# Patient Record
Sex: FEMALE | Race: WHITE | Employment: FULL TIME | URBAN - METROPOLITAN AREA
[De-identification: names, ages, dates, MRNs, and addresses within clinical notes are randomized per-mention and may not be internally consistent; named-entity substitution may affect disease eponyms.]

---

## 2017-04-01 ENCOUNTER — TRANSFERRED RECORDS (OUTPATIENT)
Dept: HEALTH INFORMATION MANAGEMENT | Facility: CLINIC | Age: 37
End: 2017-04-01

## 2017-04-01 LAB — PAP SMEAR - HIM PATIENT REPORTED: NEGATIVE

## 2018-01-03 ENCOUNTER — RECORDS - HEALTHEAST (OUTPATIENT)
Dept: LAB | Facility: CLINIC | Age: 38
End: 2018-01-03

## 2018-01-03 LAB — HIV 1+2 AB+HIV1 P24 AG SERPL QL IA: NEGATIVE

## 2018-01-04 LAB
C TRACH DNA SPEC QL PROBE+SIG AMP: NEGATIVE
HCV AB SERPL QL IA: NEGATIVE
N GONORRHOEA DNA SPEC QL NAA+PROBE: NEGATIVE
SYPHILIS RPR SCREEN - HISTORICAL: NORMAL

## 2018-01-05 LAB
HSV1 IGG SERPL QL IA: NEGATIVE
HSV2 IGG SERPL QL IA: NEGATIVE

## 2018-01-08 LAB
HUMAN PAPILLOMA VIRUS 16 DNA: NEGATIVE
HUMAN PAPILLOMA VIRUS 18 DNA: NEGATIVE
HUMAN PAPILLOMA VIRUS FINAL DIAGNOSIS: NORMAL
HUMAN PAPILLOMA VIRUS OTHER HR: NEGATIVE
SPECIMEN DESCRIPTION: NORMAL

## 2018-09-18 ENCOUNTER — RECORDS - HEALTHEAST (OUTPATIENT)
Dept: LAB | Facility: CLINIC | Age: 38
End: 2018-09-18

## 2018-09-18 LAB — HIV 1+2 AB+HIV1 P24 AG SERPL QL IA: NEGATIVE

## 2018-09-19 LAB
C TRACH DNA SPEC QL PROBE+SIG AMP: NEGATIVE
HBV SURFACE AG SERPL QL IA: NEGATIVE
HCV AB SERPL QL IA: NEGATIVE
N GONORRHOEA DNA SPEC QL NAA+PROBE: NEGATIVE
T PALLIDUM AB SER QL: NEGATIVE

## 2019-04-22 ENCOUNTER — ANCILLARY PROCEDURE (OUTPATIENT)
Dept: GENERAL RADIOLOGY | Facility: CLINIC | Age: 39
End: 2019-04-22
Attending: PODIATRIST
Payer: COMMERCIAL

## 2019-04-22 ENCOUNTER — OFFICE VISIT (OUTPATIENT)
Dept: PODIATRY | Facility: CLINIC | Age: 39
End: 2019-04-22
Payer: COMMERCIAL

## 2019-04-22 VITALS
DIASTOLIC BLOOD PRESSURE: 80 MMHG | SYSTOLIC BLOOD PRESSURE: 130 MMHG | BODY MASS INDEX: 18.83 KG/M2 | HEIGHT: 67 IN | WEIGHT: 120 LBS | HEART RATE: 81 BPM

## 2019-04-22 DIAGNOSIS — M79.675 PAIN OF TOE OF LEFT FOOT: ICD-10-CM

## 2019-04-22 DIAGNOSIS — L60.3 ONYCHODYSTROPHY: ICD-10-CM

## 2019-04-22 DIAGNOSIS — M79.675 PAIN OF TOE OF LEFT FOOT: Primary | ICD-10-CM

## 2019-04-22 PROCEDURE — 73660 X-RAY EXAM OF TOE(S): CPT | Mod: LT

## 2019-04-22 PROCEDURE — 99203 OFFICE O/P NEW LOW 30 MIN: CPT | Performed by: PODIATRIST

## 2019-04-22 ASSESSMENT — MIFFLIN-ST. JEOR: SCORE: 1261.95

## 2019-04-22 NOTE — LETTER
"    4/22/2019         RE: Emily Chandler  [address]  [address]  [facility] [address]        Dear Colleague,    Thank you for referring your patient, Emily Chandler, to the [facility]. Please see a copy of my visit note below.    PATIENT HISTORY:  Emily Chandler is a 39 year old female who presents to clinic for L 1st toe pain.  Pt \"ran into a revolving door\" 5 year sago and the toe was bruised and painful.  She didn't get an XR.  Still has some pain at the level of the IPJ with activity.  Nail was damaged at that time as well.  She thinks she may have injured it again 2 months ago, as the nail is now cracking and notes a new bruise.  0-5/10 pain.  She tried tea tree oil, Vicks for a month.  Not improving.      Review of Systems:  Patient denies fever, chills, wound, stiffness, limping, numbness, weakness, heart burn, blood in stool, chest pain with activity, calf pain when walking, shortness of breath with activity, chronic cough, easy bleeding/bruising, swelling of ankles, excessive thirst, fatigue, depression, anxiety.  Patient admits to rash, neck stiffness.     PAST MEDICAL HISTORY:  No pertinent past medical history.     PAST SURGICAL HISTORY: No pertinent past surgical history.     MEDICATIONS: No current outpatient medications on file.     ALLERGIES:  No Known Allergies     SOCIAL HISTORY:   Social History     Socioeconomic History     Marital status:      Spouse name: Not on file     Number of children: Not on file     Years of education: Not on file     Highest education level: Not on file   Occupational History     Not on file   Social Needs     Financial resource strain: Not on file     Food insecurity:     Worry: Not on file     Inability: Not on file     Transportation needs:     Medical: Not on file     Non-medical: Not on file   Tobacco Use     Smoking status: Never Smoker     Smokeless tobacco: Never Used   Substance and Sexual Activity     Alcohol use: Not on file     " "Drug use: Not on file     Sexual activity: Not on file   Lifestyle     Physical activity:     Days per week: Not on file     Minutes per session: Not on file     Stress: Not on file   Relationships     Social connections:     Talks on phone: Not on file     Gets together: Not on file     Attends Yarsani service: Not on file     Active member of club or organization: Not on file     Attends meetings of clubs or organizations: Not on file     Relationship status: Not on file     Intimate partner violence:     Fear of current or ex partner: Not on file     Emotionally abused: Not on file     Physically abused: Not on file     Forced sexual activity: Not on file   Other Topics Concern     Not on file   Social History Narrative     Not on file        FAMILY HISTORY: No pertinent family history.     EXAM:Vitals: /80   Pulse 81   Ht 1.702 m (5' 7\")   Wt 54.4 kg (120 lb)   BMI 18.79 kg/m     BMI= Body mass index is 18.79 kg/m .    General appearance: Patient is alert and fully cooperative with history & exam.  No sign of distress is noted during the visit.     Psychiatric: Affect is pleasant & appropriate.  Patient appears motivated to improve health.     Respiratory: Breathing is regular & unlabored while sitting.     HEENT: Hearing is intact to spoken word.  Speech is clear.  No gross evidence of visual impairment that would impact ambulation.     Dermatologic: L hallux nail has a longitudinal crack distally.  Proximal medial corner with small area that looks like a subungual bruise.  No pain.  No paronychia or evidence of soft tissue infection is noted.     Vascular: DP & PT pulses are intact & regular bilaterally.  No significant edema or varicosities noted.  CFT and skin temperature are normal to both lower extremities.     Neurologic: Lower extremity sensation is intact to light touch.  No evidence of weakness or contracture in the lower extremities.  No evidence of neuropathy.     Musculoskeletal: Pt's L " 1st toe is quite sensitive at the IPJ with palpation or joint motion.  Patient is ambulatory without assistive device or brace.  No gross ankle deformity noted.  No foot or ankle joint effusion is noted.    XRs of L 1st toe reviewed with pt.  Perhaps some chronic cortical thickening of distal phalanx indicating old fx/injury.     ASSESSMENT: L 1st toe pain, onychodystrophy     PLAN:  Reviewed patient's chart in epic.  Discussed condition and treatment options including pros and cons.    Question prior fx that may be leading to some continued toe pain.  I don't see evidence of nonunion.  Discussed if fx entered the joint perhaps some early DJD is a component.  Discussed stiff soled shoes, orthotics as helpful.  Discussed option of IPJ steroid injection by IR.  Surgical fusion can be considered if injection helps, and pain returns.  Pt deferred further intervention.    Discussed nail changes.  Likely permanent due to trauma.  Fungus possible.    Discussed causes of nail fungus.  Discussed treatment options with patient and explained that there isn't one treatment that is 100% effective.  Debridement is an option.  Discussed oral lamisil which is the most effective at about 70% but can have liver effects.  Discussed over the counter antifungal creams.  Explained that these are less effective and need to be applied twice a day for about 3-4 months.  Also talked about prescription topicals, which have similar efficacy.  Also discussed that if there was damage to the nail and the nail is now dystrophic that none of the above is going to change the nail.  Discussed that if it becomes painful, we can remove the nail in clinic.      Pt will monitor.  Advised she watch the dark area to make sure it is moving out with nail growth over the next 3 months.  If not, she should return for recheck to consider nail removal to assess underlying skin.    F/u prn.    Tavares Sen DPM, FACFAS          Again, thank you for  allowing me to participate in the care of your patient.        Sincerely,        Tavares Sen DPM

## 2019-04-22 NOTE — PATIENT INSTRUCTIONS
Thank you for choosing [facility]!    Follow up as needed    DR. ROUSE'S CLINIC LOCATIONS     MONDAY  [place] TUESDAY & FRIDAY AM  [place]   [address]   [address]   [place] [address] [place] [address]   [phone]  [phone]  [phone]       WEDNESDAY  [facility] [place] SCHEDULE SURGERY: [phone]   [address] [facility] APPOINTMENTS: [phone]   [place] [address] BILLING QUESTIONS: [phone]   [phone]       NAIL FUNGUS / ONYCHOMYCOSIS   Nail fungus is not a hygiene problem and will not likely lead to significant medical   problems. The nails may get thick causing pain and possibly local skin infection.   Treatments include debridement (trimming), oral antifungals, topical antifungals and complete removal of the nail. Most fungal nails are not treated.   Topicals such as tea tree oil can be helpful for surface fungus and may, at best, limit   progression. Over the counter creams (such as Lamisil) can also be used however, their effectiveness is also quite low.  Topical treatment with Pen lac is expensive and often not covered by insurance. Pen lac has an approximate 8% success rate. Topical therapy recommendations is to apply twice a day for at least 3-4 months as it takes 9 months for new nail to grow out.    Experts suggest soaking your feet for 15 to 20 minutes in a mixture of 1 cup vinegar to 4 cups warm water. Be sure to rinse well and pat your feet dry when you're done. You can soak your feet like this daily. But if your skin becomes irritated, try soaking only two to three times a week. Vicks VapoRub, as with vinegar, there have been no controlled clinical trials to assess the effectiveness of Vicks VapoRub on nail fungus, but there have been numerous anecdotal reports that it works. There's no consensus on how often to apply this product, so check with your doctor before using it on  your nails.     Oral therapies include Sporanox and Lamisil. Oral therapies are also expensive and not very effective. Side effects such as liver disease are the main concern. Return of fungus is common even if the treatment worked.     Other Tips:  - Penlac nail medication apply daily x 4 months; remove old polish first day of each week  - Antifungal cream/powder (Zeasorb) - apply daily to feet and shoes x 2 months  - Clean shoes with Lysol or in washing machine every few weeks  - Rotate shoe gear; give them 24 hours to dry out between days wearing them  - Clean pair of socks in morning, clean pair in afternoon if your feet sweat  - Shower shoes used in public showers/pools    OVER THE COUNTER INSERTS    SuperFeet   SofsoDefense Mobile Fit MobileVeda   Power Step   Walk-Fit Arch Cradles     Most of these can be found at your local [employer], [employer], sporting goods [employer], or online:    1.  https://[email or web address].CaterCow/en-us/  2.  Https://Osteomimetics/  3.  Https://www.powersteps.com/    **A good high quality over the counter insert should cost around $40-$50          BODY WEIGHT AND YOUR FEET  The following information is included in the after visit summary for all patients. Body weight can be a sensitive issue to discuss in clinic, but we think the following information is very important. Although we focus on the feet and ankles, we do support the overall health of our patients.     Many things can cause foot and ankle problems. Foot structure, activity level, foot mechanics and injuries are common causes of pain. One very important issue that often goes unmentioned, is body weight. Extra weight can cause increased stress on muscles, ligaments, bones and tendons. Sometimes just a few extra pounds is all it takes to put one over her/his threshold. Without reducing that stress, it can be difficult to alleviate pain. As Foot & Ankle specialists, our job is addressing the lower extremity problem and possible  causes. Regarding extra body weight, we encourage patients to discuss diet and weight management plans with their primary care doctors. It is this team approach that gives you the best opportunity for pain relief and getting you back on your feet.      [facility] has a Comprehensive Weight Management Program. This program includes counseling, education, non-surgical and surgical approaches to weight loss. If you are interested in learning more either talk to you primary care provider or call [phone].

## 2019-04-22 NOTE — PROGRESS NOTES
"PATIENT HISTORY:  Emily Chandler is a 39 year old female who presents to clinic for L 1st toe pain.  Pt \"ran into a revolving door\" 5 year sago and the toe was bruised and painful.  She didn't get an XR.  Still has some pain at the level of the IPJ with activity.  Nail was damaged at that time as well.  She thinks she may have injured it again 2 months ago, as the nail is now cracking and notes a new bruise.  0-5/10 pain.  She tried tea tree oil, Vicks for a month.  Not improving.      Review of Systems:  Patient denies fever, chills, wound, stiffness, limping, numbness, weakness, heart burn, blood in stool, chest pain with activity, calf pain when walking, shortness of breath with activity, chronic cough, easy bleeding/bruising, swelling of ankles, excessive thirst, fatigue, depression, anxiety.  Patient admits to rash, neck stiffness.     PAST MEDICAL HISTORY:  No pertinent past medical history.     PAST SURGICAL HISTORY: No pertinent past surgical history.     MEDICATIONS: No current outpatient medications on file.     ALLERGIES:  No Known Allergies     SOCIAL HISTORY:   Social History     Socioeconomic History     Marital status:      Spouse name: Not on file     Number of children: Not on file     Years of education: Not on file     Highest education level: Not on file   Occupational History     Not on file   Social Needs     Financial resource strain: Not on file     Food insecurity:     Worry: Not on file     Inability: Not on file     Transportation needs:     Medical: Not on file     Non-medical: Not on file   Tobacco Use     Smoking status: Never Smoker     Smokeless tobacco: Never Used   Substance and Sexual Activity     Alcohol use: Not on file     Drug use: Not on file     Sexual activity: Not on file   Lifestyle     Physical activity:     Days per week: Not on file     Minutes per session: Not on file     Stress: Not on file   Relationships     Social connections:     Talks on phone: Not on " "file     Gets together: Not on file     Attends [employer] service: Not on file     Active member of club or organization: Not on file     Attends meetings of clubs or organizations: Not on file     Relationship status: Not on file     Intimate partner violence:     Fear of current or ex partner: Not on file     Emotionally abused: Not on file     Physically abused: Not on file     Forced sexual activity: Not on file   Other Topics Concern     Not on file   Social History Narrative     Not on file        FAMILY HISTORY: No pertinent family history.     EXAM:Vitals: /80   Pulse 81   Ht 1.702 m (5' 7\")   Wt 54.4 kg (120 lb)   BMI 18.79 kg/m    BMI= Body mass index is 18.79 kg/m .    General appearance: Patient is alert and fully cooperative with history & exam.  No sign of distress is noted during the visit.     Psychiatric: Affect is pleasant & appropriate.  Patient appears motivated to improve health.     Respiratory: Breathing is regular & unlabored while sitting.     HEENT: Hearing is intact to spoken word.  Speech is clear.  No gross evidence of visual impairment that would impact ambulation.     Dermatologic: L hallux nail has a longitudinal crack distally.  Proximal medial corner with small area that looks like a subungual bruise.  No pain.  No paronychia or evidence of soft tissue infection is noted.     Vascular: DP & PT pulses are intact & regular bilaterally.  No significant edema or varicosities noted.  CFT and skin temperature are normal to both lower extremities.     Neurologic: Lower extremity sensation is intact to light touch.  No evidence of weakness or contracture in the lower extremities.  No evidence of neuropathy.     Musculoskeletal: Pt's L 1st toe is quite sensitive at the IPJ with palpation or joint motion.  Patient is ambulatory without assistive device or brace.  No gross ankle deformity noted.  No foot or ankle joint effusion is noted.    XRs of L 1st toe reviewed with pt.  Perhaps " some chronic cortical thickening of distal phalanx indicating old fx/injury.  Small ossicle to plantar IPJ area, question sesamoid vs old fx fragment.     ASSESSMENT: L 1st toe pain, onychodystrophy     PLAN:  Reviewed patient's chart in epic.  Discussed condition and treatment options including pros and cons.    Question prior fx that may be leading to some continued toe pain.  I don't see evidence of nonunion.  Discussed if fx entered the joint perhaps some early DJD is a component.  Discussed stiff soled shoes, orthotics as helpful.  Discussed option of IPJ steroid injection by IR.  Surgical fusion can be considered if injection helps, and pain returns.  Pt deferred further intervention.    Discussed nail changes.  Likely permanent due to trauma.  Fungus possible.    Discussed causes of nail fungus.  Discussed treatment options with patient and explained that there isn't one treatment that is 100% effective.  Debridement is an option.  Discussed oral lamisil which is the most effective at about 70% but can have liver effects.  Discussed over the counter antifungal creams.  Explained that these are less effective and need to be applied twice a day for about 3-4 months.  Also talked about prescription topicals, which have similar efficacy.  Also discussed that if there was damage to the nail and the nail is now dystrophic that none of the above is going to change the nail.  Discussed that if it becomes painful, we can remove the nail in clinic.      Pt will monitor.  Advised she watch the dark area to make sure it is moving out with nail growth over the next 3 months.  If not, she should return for recheck to consider nail removal to assess underlying skin.    F/u prn.    Tavares Sen DPM, FACFAS

## 2019-05-07 ENCOUNTER — OFFICE VISIT (OUTPATIENT)
Dept: OTOLARYNGOLOGY | Facility: CLINIC | Age: 39
End: 2019-05-07
Payer: COMMERCIAL

## 2019-05-07 VITALS
SYSTOLIC BLOOD PRESSURE: 125 MMHG | OXYGEN SATURATION: 100 % | DIASTOLIC BLOOD PRESSURE: 77 MMHG | HEART RATE: 72 BPM | HEIGHT: 67 IN | BODY MASS INDEX: 18.83 KG/M2 | WEIGHT: 120 LBS

## 2019-05-07 DIAGNOSIS — J34.2 NASAL SEPTAL DEVIATION: ICD-10-CM

## 2019-05-07 DIAGNOSIS — J34.3 NASAL TURBINATE HYPERTROPHY: ICD-10-CM

## 2019-05-07 DIAGNOSIS — J34.89 NASAL OBSTRUCTION: Primary | ICD-10-CM

## 2019-05-07 PROCEDURE — 99204 OFFICE O/P NEW MOD 45 MIN: CPT | Performed by: OTOLARYNGOLOGY

## 2019-05-07 ASSESSMENT — MIFFLIN-ST. JEOR: SCORE: 1261.95

## 2019-05-07 NOTE — PROGRESS NOTES
Chief Complaint - Nasal obstruction    History of Present Illness - Emily Chandler is a 39 year old female who presents for evaluation of nasal obstruction. The patient describes symptoms of left-sided nasal obstruction for the past many years. No epistaxis. The patient notes symptoms most predominately on the left side. The patient notes some mild seasonal allergies. 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 Now she uses saline. possible history of nasal trauma. No prior history of nasal surgery. No history of smoking.     Past Medical History - healthy    Allergies - No Known Allergies    Social History -   Social History     Socioeconomic History     Marital status:      Spouse name: Not on file     Number of children: Not on file     Years of education: Not on file     Highest education level: Not on file   Occupational History     Not on file   Social Needs     Financial resource strain: Not on file     Food insecurity:     Worry: Not on file     Inability: Not on file     Transportation needs:     Medical: Not on file     Non-medical: Not on file   Tobacco Use     Smoking status: Never Smoker     Smokeless tobacco: Never Used   Substance and Sexual Activity     Alcohol use: Not on file     Drug use: Not on file     Sexual activity: Not on file   Lifestyle     Physical activity:     Days per week: Not on file     Minutes per session: Not on file     Stress: Not on file   Relationships     Social connections:     Talks on phone: Not on file     Gets together: Not on file     Attends Rastafari service: Not on file     Active member of club or organization: Not on file     Attends meetings of clubs or organizations: Not on file     Relationship status: Not on file     Intimate partner violence:     Fear of current or ex partner: Not on file     Emotionally abused: Not on file     Physically abused: Not on file     Forced sexual activity: Not on file   Other  "Topics Concern     Not on file   Social History Narrative     Not on file   nonsmoker    Family History - no known history of sinus or nose problems.    Review of Systems - As per HPI and PMHx, otherwise 10+ comprehensive system review is negative.    Physical Exam  /77   Pulse 72   Ht 1.702 m (5' 7\")   Wt 54.4 kg (120 lb)   SpO2 100%   BMI 18.79 kg/m    General - The patient is in no distress. Alert and oriented to person and place, answers questions and cooperates with examination appropriately.   Neurologic - CN II-XII are grossly intact. No focal neurologic deficits.   Voice and Breathing - The patient was breathing comfortably without the use of accessory muscles. There was no wheezing, stridor, or stertor.  The patients voice was clear and strong.  Eyes - Extraocular movements intact. Sclera were not icteric or injected, conjunctiva were pink and moist.  Mouth - Examination of the oral cavity showed pink, healthy oral mucosa. No lesions or ulcerations noted.  The tongue was mobile and midline, and the dentition were in good condition.    Throat - The walls of the oropharynx were smooth, pink, moist, symmetric, and had no lesions or ulcerations.  The tonsillar pillars and soft palate were symmetric.  The uvula was midline on elevation. Tonsils absent.  Neck -  Palpation of the occipital, submental, submandibular, internal jugular chain, and supraclavicular nodes did not demonstrate any abnormal lymph nodes or masses. No parotid masses. Palpation of the thyroid was soft and smooth, with no nodules or goiter appreciated.  The trachea was mobile and midline.  Cardiovascular - carotid pulses are 2+ bilaterally, regular rhythm  Nose - External contour is symmetric, no gross deflection or scars.  Nasal mucosa is pink and moist with clear mucus. Her turbinates are hypertrophic. The septum was very deviated to the left.  No polyps, masses, or purulence noted on examination.        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  Her septum is very severely deviated to the left anteriorly and completely blocks the airway.  Therefore I recommend septoplasty and bilateral inferior turbinate reduction.    Based on the physical exam and history, my recommendation is for endoscopic septoplasty with or without turbinate reduction. I counseled the patient on the risks of surgery, including infection, bleeding, risks of general anesthesia, the risk of failure of the surgery to relieve nasal obstruction, the need for revision. They understood and wished to proceed to scheduling.      Alfonso Iniguez MD  Otolaryngology  [facility]

## 2019-05-07 NOTE — LETTER
5/7/2019         RE: Emily Chandler  [address]  [address]  [facility] [address]        Dear Colleague,    Thank you for referring your patient, Emily Chandler, to the [facility]. Please see a copy of my visit note below.    Chief Complaint - Nasal obstruction    History of Present Illness - Emily Chandler is a 39 year old female who presents for evaluation of nasal obstruction. The patient describes symptoms of left-sided nasal obstruction for the past many years. No epistaxis. The patient notes symptoms most predominately on the left side. The patient notes some mild seasonal allergies. 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 Now she uses saline. possible history of nasal trauma. No prior history of nasal surgery. No history of smoking.     Past Medical History - healthy    Allergies - No Known Allergies    Social History -   Social History     Socioeconomic History     Marital status:      Spouse name: Not on file     Number of children: Not on file     Years of education: Not on file     Highest education level: Not on file   Occupational History     Not on file   Social Needs     Financial resource strain: Not on file     Food insecurity:     Worry: Not on file     Inability: Not on file     Transportation needs:     Medical: Not on file     Non-medical: Not on file   Tobacco Use     Smoking status: Never Smoker     Smokeless tobacco: Never Used   Substance and Sexual Activity     Alcohol use: Not on file     Drug use: Not on file     Sexual activity: Not on file   Lifestyle     Physical activity:     Days per week: Not on file     Minutes per session: Not on file     Stress: Not on file   Relationships     Social connections:     Talks on phone: Not on file     Gets together: Not on file     Attends Synagogue service: Not on file     Active member of club or organization: Not on file     Attends meetings of clubs or organizations: Not  "on file     Relationship status: Not on file     Intimate partner violence:     Fear of current or ex partner: Not on file     Emotionally abused: Not on file     Physically abused: Not on file     Forced sexual activity: Not on file   Other Topics Concern     Not on file   Social History Narrative     Not on file   nonsmoker    Family History - no known history of sinus or nose problems.    Review of Systems - As per HPI and PMHx, otherwise 10+ comprehensive system review is negative.    Physical Exam  /77   Pulse 72   Ht 1.702 m (5' 7\")   Wt 54.4 kg (120 lb)   SpO2 100%   BMI 18.79 kg/m     General - The patient is in no distress. Alert and oriented to person and place, answers questions and cooperates with examination appropriately.   Neurologic - CN II-XII are grossly intact. No focal neurologic deficits.   Voice and Breathing - The patient was breathing comfortably without the use of accessory muscles. There was no wheezing, stridor, or stertor.  The patients voice was clear and strong.  Eyes - Extraocular movements intact. Sclera were not icteric or injected, conjunctiva were pink and moist.  Mouth - Examination of the oral cavity showed pink, healthy oral mucosa. No lesions or ulcerations noted.  The tongue was mobile and midline, and the dentition were in good condition.    Throat - The walls of the oropharynx were smooth, pink, moist, symmetric, and had no lesions or ulcerations.  The tonsillar pillars and soft palate were symmetric.  The uvula was midline on elevation. Tonsils absent.  Neck -  Palpation of the occipital, submental, submandibular, internal jugular chain, and supraclavicular nodes did not demonstrate any abnormal lymph nodes or masses. No parotid masses. Palpation of the thyroid was soft and smooth, with no nodules or goiter appreciated.  The trachea was mobile and midline.  Cardiovascular - carotid pulses are 2+ bilaterally, regular rhythm  Nose - External contour is symmetric, " no gross deflection or scars.  Nasal mucosa is pink and moist with clear mucus. Her turbinates are hypertrophic. The septum was very deviated to the left.  No polyps, masses, or purulence noted on examination.        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  Her septum is very severely deviated to the left anteriorly and completely blocks the airway.  Therefore I recommend septoplasty and bilateral inferior turbinate reduction.    Based on the physical exam and history, my recommendation is for endoscopic septoplasty with or without turbinate reduction. I counseled the patient on the risks of surgery, including infection, bleeding, risks of general anesthesia, the risk of failure of the surgery to relieve nasal obstruction, the need for revision. They understood and wished to proceed to scheduling.      Alfonso Iniguez MD  Otolaryngology  [facility]      Again, thank you for allowing me to participate in the care of your patient.        Sincerely,        Alfonso Iniguez MD

## 2019-05-07 NOTE — PATIENT INSTRUCTIONS
General Scheduling Information  To schedule your CT/MRI scan, please contact [place] at [phone]   [address] [place] [address]    To schedule your Surgery, please contact our Specialty Schedulers at [phone]    ENT Clinic Locations Clinic Hours Telephone Number     [facility]  [address] [place] [address]   Tuesday:       8:00am -- 4:00pm    Wednesday:  8:00am - 4:00pm   To schedule an appointment with   Dr. Iniguez,   please contact our   Specialty Scheduling Department at:     [phone]       [facility]  [address] [place] [address]   Friday:          8:00am - 4:00pm         Urgent Care Locations Clinic Hours Telephone Numbers     [facility]  [address] [place] [address]     Monday-Friday:     11:00pm - 9:00pm    Saturday-Sunday:  9:00am - 5:00pm   [phone]     [facility]  [address] [place] [address]     Monday-Friday:      5:00pm - 9:00pm     Saturday-Sunday:  9:00am - 5:00pm   [phone]

## 2019-05-08 ENCOUNTER — TELEPHONE (OUTPATIENT)
Dept: OTOLARYNGOLOGY | Facility: CLINIC | Age: 39
End: 2019-05-08

## 2019-05-08 NOTE — TELEPHONE ENCOUNTER
Type of surgery: septoplasty;bilateral inferior turbinate submucous resection and out-fracture  CPT 92015, 81708, 68760  Nasal obstruction [J34.89]  - Primary       Nasal septal deviation [J34.2]         Location of surgery: [facility]  Date and time of surgery: 6-6-19  TBD  Surgeon: Dr Iniguez  Pre-Op Appt Date: TBD  Post-Op Appt Date: 6-13-19   Packet sent out: Yes  Pre-cert/Authorization completed: no pre cert needed, per [employer] online list   Date: 05/08/2019    Thank you,   Maru Yepez   [facility] Department  [phone]

## 2019-05-31 ENCOUNTER — OFFICE VISIT (OUTPATIENT)
Dept: FAMILY MEDICINE | Facility: CLINIC | Age: 39
End: 2019-05-31
Payer: COMMERCIAL

## 2019-05-31 VITALS
BODY MASS INDEX: 19.11 KG/M2 | SYSTOLIC BLOOD PRESSURE: 119 MMHG | RESPIRATION RATE: 16 BRPM | OXYGEN SATURATION: 99 % | HEART RATE: 67 BPM | DIASTOLIC BLOOD PRESSURE: 80 MMHG | WEIGHT: 122 LBS | TEMPERATURE: 98.1 F

## 2019-05-31 DIAGNOSIS — Z01.818 PREOP GENERAL PHYSICAL EXAM: Primary | ICD-10-CM

## 2019-05-31 DIAGNOSIS — J34.2 DEVIATED NASAL SEPTUM: ICD-10-CM

## 2019-05-31 DIAGNOSIS — Z11.3 SCREEN FOR STD (SEXUALLY TRANSMITTED DISEASE): ICD-10-CM

## 2019-05-31 DIAGNOSIS — K64.4 EXTERNAL HEMORRHOIDS: ICD-10-CM

## 2019-05-31 LAB
HCG UR QL: NEGATIVE
HGB BLD-MCNC: 13.6 G/DL (ref 11.7–15.7)

## 2019-05-31 PROCEDURE — 36415 COLL VENOUS BLD VENIPUNCTURE: CPT | Performed by: FAMILY MEDICINE

## 2019-05-31 PROCEDURE — 85018 HEMOGLOBIN: CPT | Performed by: FAMILY MEDICINE

## 2019-05-31 PROCEDURE — 99204 OFFICE O/P NEW MOD 45 MIN: CPT | Performed by: FAMILY MEDICINE

## 2019-05-31 PROCEDURE — 81025 URINE PREGNANCY TEST: CPT | Performed by: FAMILY MEDICINE

## 2019-05-31 PROCEDURE — 87591 N.GONORRHOEAE DNA AMP PROB: CPT | Performed by: FAMILY MEDICINE

## 2019-05-31 PROCEDURE — 87491 CHLMYD TRACH DNA AMP PROBE: CPT | Performed by: FAMILY MEDICINE

## 2019-05-31 PROCEDURE — 86780 TREPONEMA PALLIDUM: CPT | Performed by: FAMILY MEDICINE

## 2019-05-31 PROCEDURE — 87389 HIV-1 AG W/HIV-1&-2 AB AG IA: CPT | Performed by: FAMILY MEDICINE

## 2019-05-31 NOTE — PROGRESS NOTES
[address] [address]  [phone]  Dept: [phone]    PRE-OP EVALUATION:  Today's date: 2019    Emily Chandler (: 1980) presents for pre-operative evaluation assessment as requested by Dr. Iniguez.  She requires evaluation and anesthesia risk assessment prior to undergoing surgery/procedure for treatment of septoplasty .    Fax number for surgical facility: [phone]  Primary Physician: No Ref-Primary, Physician  Type of Anesthesia Anticipated: General    Patient has a Health Care Directive or Living Will:  NO    Preop Questions 2019   Who is doing your surgery? Dr Iniguez   What are you having done? Deviated Septum fix   Date of Surgery/Procedure: 19   Facility or Hospital where procedure/surgery will be performed: [facility]   1.  Do you have a history of Heart attack, stroke, stent, coronary bypass surgery, or other heart surgery? No   2.  Do you ever have any pain or discomfort in your chest? No   3.  Do you have a history of  Heart Failure? No   4.   Are you troubled by shortness of breath when:  walking on a level surface, or up a slight hill, or at night? No   5.  Do you currently have a cold, bronchitis or other respiratory infection? No   6.  Do you have a cough, shortness of breath, or wheezing? No   7.  Do you sometimes get pains in the calves of your legs when you walk? No   8. Do you or anyone in your family have previous history of blood clots? No   9.  Do you or does anyone in your family have a serious bleeding problem such as prolonged bleeding following surgeries or cuts? No   10. Have you ever had problems with anemia or been told to take iron pills? No   11. Have you had any abnormal blood loss such as black, tarry or bloody stools, or abnormal vaginal bleeding? No   12. Have you ever had a blood transfusion? No   13. Have you or any of your relatives ever had problems with anesthesia? No   14.  Do you have sleep apnea, excessive snoring or daytime drowsiness? No   15. Do you have any prosthetic heart valves? No   16. Do you have prosthetic joints? No   17. Is there any chance that you may be pregnant? No         HPI:     HPI related to upcoming procedure:  The patient is scheduled for surgery to address her deviated septum; all of her life, her left nostril has been plugged.  She has a hard time getting to sleep due to this.  She has not had recurrent sinusitis.  She is otherwise in her usual state of health.    She has a Mirena IUD for contraception; this also helps with her h/o dysmenorrhea.  She does not get regular periods with it.     STI screen: asymptomatic, the patient would just like to have routine STI screen while she is here.    History of right rotator cuff partial tear, treated with physical therapy, now trying massage.     External hemorrhoid: occasionally painful, would like to consult with colorectal. ---referral is done.    Rash around nose, mouth: had been applying steroids long-term per her dermatologist's recommendation, which then gave her perioral dermatitis.  She stopped using steroid creams about 5 months ago; it is gradually improving.  Tea tree oil helps.        MEDICAL HISTORY:   There are no active problems to display for this patient.     Past Medical History:   Diagnosis Date     Motion sickness      Past Surgical History:   Procedure Laterality Date     APPENDECTOMY       ENT SURGERY      tonsils     No current outpatient medications on file.     OTC products: None, except as noted above    Allergies   Allergen Reactions     Nickel       Latex Allergy: NO    Social History     Tobacco Use     Smoking status: Never Smoker     Smokeless tobacco: Never Used   Substance Use Topics     Alcohol use: Not on file     History   Drug Use Not on file       REVIEW OF SYSTEMS:   Constitutional, neuro, ENT, endocrine, pulmonary, cardiac, gastrointestinal, genitourinary, musculoskeletal,  integument and psychiatric systems are negative, except as otherwise noted.    EXAM:   /80 (BP Location: Left arm, Patient Position: Sitting, Cuff Size: Adult Regular)   Pulse 67   Temp 98.1  F (36.7  C) (Oral)   Resp 16   Wt 55.3 kg (122 lb)   SpO2 99%   BMI 19.11 kg/m      GENERAL APPEARANCE: healthy, alert and no distress     EYES: EOMI, PERRL     HENT: ear canals and TM's normal and nose and mouth without ulcers or lesions     NECK: no adenopathy, no asymmetry, masses, or scars and thyroid normal to palpation     RESP: lungs clear to auscultation - no rales, rhonchi or wheezes     CV: regular rates and rhythm, normal S1 S2, no S3 or S4 and no murmur, click or rub     ABDOMEN:  soft, nontender, no HSM or masses and bowel sounds normal     MS: extremities normal- no gross deformities noted, no evidence of inflammation in joints, FROM in all extremities.     SKIN: no suspicious lesions or rashes; faint erythema around nares, most c/w seborrheic dermatitis.     NEURO: Normal strength and tone, sensory exam grossly normal, mentation intact and speech normal     PSYCH: mentation appears normal. and affect normal/bright     LYMPHATICS: No cervical adenopathy    DIAGNOSTICS:     Labs Drawn and in Process:   Unresulted Labs Ordered in the Past 30 Days of this Admission     Date and Time Order Name Status Description    5/31/2019 1331 TREPONEMA ABS W REFLEX TO RPR AND TITER In process     5/31/2019 1331 HIV ANTIGEN ANTIBODY COMBO In process     5/31/2019 1331 HEMOGLOBIN In process           No results for input(s): HGB, PLT, INR, NA, POTASSIUM, CR, A1C in the last 12519 hours.     IMPRESSION:   Reason for surgery/procedure: deviated septum  Diagnosis/reason for consult: pre-operative evaluation    The proposed surgical procedure is considered INTERMEDIATE risk.    REVISED CARDIAC RISK INDEX  The patient has the following serious cardiovascular risks for perioperative complications such as (MI, PE, VFib and 3   AV Block):  No serious cardiac risks  INTERPRETATION: 0 risks: Class I (very low risk - 0.4% complication rate)    The patient has the following additional risks for perioperative complications:  No identified additional risks      ICD-10-CM    1. Preop general physical exam Z01.818        RECOMMENDATIONS:             APPROVAL GIVEN to proceed with proposed procedure, without further diagnostic evaluation       Signed Electronically by: Merced Gerard MD    Copy of this evaluation report is provided to requesting physician.    [facility] Preop Guidelines    Revised Cardiac Risk Index

## 2019-06-01 LAB — T PALLIDUM AB SER QL: NONREACTIVE

## 2019-06-02 LAB
C TRACH DNA SPEC QL NAA+PROBE: NEGATIVE
N GONORRHOEA DNA SPEC QL NAA+PROBE: NEGATIVE
SPECIMEN SOURCE: NORMAL
SPECIMEN SOURCE: NORMAL

## 2019-06-03 LAB — HIV 1+2 AB+HIV1 P24 AG SERPL QL IA: NONREACTIVE

## 2019-06-05 ENCOUNTER — ANESTHESIA EVENT (OUTPATIENT)
Dept: SURGERY | Facility: AMBULATORY SURGERY CENTER | Age: 39
End: 2019-06-05

## 2019-06-06 ENCOUNTER — PRE VISIT (OUTPATIENT)
Dept: SURGERY | Facility: CLINIC | Age: 39
End: 2019-06-06

## 2019-06-06 ENCOUNTER — HOSPITAL ENCOUNTER (OUTPATIENT)
Facility: AMBULATORY SURGERY CENTER | Age: 39
Discharge: HOME | End: 2019-06-06
Attending: OTOLARYNGOLOGY | Admitting: OTOLARYNGOLOGY
Payer: COMMERCIAL

## 2019-06-06 ENCOUNTER — ANESTHESIA (OUTPATIENT)
Dept: SURGERY | Facility: AMBULATORY SURGERY CENTER | Age: 39
End: 2019-06-06
Payer: COMMERCIAL

## 2019-06-06 VITALS
RESPIRATION RATE: 19 BRPM | HEART RATE: 90 BPM | OXYGEN SATURATION: 100 % | TEMPERATURE: 97.6 F | DIASTOLIC BLOOD PRESSURE: 99 MMHG | SYSTOLIC BLOOD PRESSURE: 134 MMHG

## 2019-06-06 DIAGNOSIS — J34.89 NASAL OBSTRUCTION: Primary | ICD-10-CM

## 2019-06-06 DIAGNOSIS — J34.3 NASAL TURBINATE HYPERTROPHY: ICD-10-CM

## 2019-06-06 DIAGNOSIS — J34.2 NASAL SEPTAL DEVIATION: ICD-10-CM

## 2019-06-06 LAB — HCG UR QL: NEGATIVE

## 2019-06-06 PROCEDURE — 30140 RESECT INFERIOR TURBINATE: CPT | Mod: 50 | Performed by: OTOLARYNGOLOGY

## 2019-06-06 PROCEDURE — G8916 PT W IV AB GIVEN ON TIME: HCPCS

## 2019-06-06 PROCEDURE — 30520 REPAIR OF NASAL SEPTUM: CPT

## 2019-06-06 PROCEDURE — G8907 PT DOC NO EVENTS ON DISCHARG: HCPCS

## 2019-06-06 PROCEDURE — 30520 REPAIR OF NASAL SEPTUM: CPT | Performed by: OTOLARYNGOLOGY

## 2019-06-06 PROCEDURE — 81025 URINE PREGNANCY TEST: CPT | Performed by: ANESTHESIOLOGY

## 2019-06-06 PROCEDURE — 30140 RESECT INFERIOR TURBINATE: CPT | Mod: 50

## 2019-06-06 RX ORDER — BACITRACIN ZINC 500 [USP'U]/G
OINTMENT TOPICAL PRN
Status: DISCONTINUED | OUTPATIENT
Start: 2019-06-06 | End: 2019-06-06 | Stop reason: HOSPADM

## 2019-06-06 RX ORDER — ONDANSETRON 2 MG/ML
INJECTION INTRAMUSCULAR; INTRAVENOUS PRN
Status: DISCONTINUED | OUTPATIENT
Start: 2019-06-06 | End: 2019-06-06

## 2019-06-06 RX ORDER — FENTANYL CITRATE 50 UG/ML
INJECTION, SOLUTION INTRAMUSCULAR; INTRAVENOUS PRN
Status: DISCONTINUED | OUTPATIENT
Start: 2019-06-06 | End: 2019-06-06

## 2019-06-06 RX ORDER — ACETAMINOPHEN 325 MG/1
975 TABLET ORAL ONCE
Status: COMPLETED | OUTPATIENT
Start: 2019-06-06 | End: 2019-06-06

## 2019-06-06 RX ORDER — SODIUM CHLORIDE, SODIUM LACTATE, POTASSIUM CHLORIDE, CALCIUM CHLORIDE 600; 310; 30; 20 MG/100ML; MG/100ML; MG/100ML; MG/100ML
INJECTION, SOLUTION INTRAVENOUS CONTINUOUS
Status: DISCONTINUED | OUTPATIENT
Start: 2019-06-06 | End: 2019-06-07 | Stop reason: HOSPADM

## 2019-06-06 RX ORDER — MEPERIDINE HYDROCHLORIDE 25 MG/ML
12.5 INJECTION INTRAMUSCULAR; INTRAVENOUS; SUBCUTANEOUS
Status: DISCONTINUED | OUTPATIENT
Start: 2019-06-06 | End: 2019-06-07 | Stop reason: HOSPADM

## 2019-06-06 RX ORDER — HYDROMORPHONE HYDROCHLORIDE 1 MG/ML
.3-.5 INJECTION, SOLUTION INTRAMUSCULAR; INTRAVENOUS; SUBCUTANEOUS EVERY 10 MIN PRN
Status: DISCONTINUED | OUTPATIENT
Start: 2019-06-06 | End: 2019-06-07 | Stop reason: HOSPADM

## 2019-06-06 RX ORDER — PROPOFOL 10 MG/ML
INJECTION, EMULSION INTRAVENOUS CONTINUOUS PRN
Status: DISCONTINUED | OUTPATIENT
Start: 2019-06-06 | End: 2019-06-06

## 2019-06-06 RX ORDER — DOXYCYCLINE 100 MG/1
100 CAPSULE ORAL 2 TIMES DAILY
Qty: 14 CAPSULE | Refills: 0 | Status: SHIPPED | OUTPATIENT
Start: 2019-06-06 | End: 2019-08-02

## 2019-06-06 RX ORDER — KETOROLAC TROMETHAMINE 30 MG/ML
30 INJECTION, SOLUTION INTRAMUSCULAR; INTRAVENOUS EVERY 6 HOURS PRN
Status: DISCONTINUED | OUTPATIENT
Start: 2019-06-06 | End: 2019-06-07 | Stop reason: HOSPADM

## 2019-06-06 RX ORDER — LIDOCAINE 40 MG/G
CREAM TOPICAL
Status: DISCONTINUED | OUTPATIENT
Start: 2019-06-06 | End: 2019-06-07 | Stop reason: HOSPADM

## 2019-06-06 RX ORDER — FENTANYL CITRATE 50 UG/ML
25-50 INJECTION, SOLUTION INTRAMUSCULAR; INTRAVENOUS
Status: DISCONTINUED | OUTPATIENT
Start: 2019-06-06 | End: 2019-06-07 | Stop reason: HOSPADM

## 2019-06-06 RX ORDER — LIDOCAINE HYDROCHLORIDE AND EPINEPHRINE 10; 10 MG/ML; UG/ML
INJECTION, SOLUTION INFILTRATION; PERINEURAL PRN
Status: DISCONTINUED | OUTPATIENT
Start: 2019-06-06 | End: 2019-06-06 | Stop reason: HOSPADM

## 2019-06-06 RX ORDER — ONDANSETRON 4 MG/1
4 TABLET, ORALLY DISINTEGRATING ORAL EVERY 30 MIN PRN
Status: DISCONTINUED | OUTPATIENT
Start: 2019-06-06 | End: 2019-06-07 | Stop reason: HOSPADM

## 2019-06-06 RX ORDER — LIDOCAINE HYDROCHLORIDE 20 MG/ML
INJECTION, SOLUTION INFILTRATION; PERINEURAL PRN
Status: DISCONTINUED | OUTPATIENT
Start: 2019-06-06 | End: 2019-06-06

## 2019-06-06 RX ORDER — HYDROCODONE BITARTRATE AND ACETAMINOPHEN 5; 325 MG/1; MG/1
1-2 TABLET ORAL EVERY 4 HOURS PRN
Qty: 20 TABLET | Refills: 0 | Status: SHIPPED | OUTPATIENT
Start: 2019-06-06 | End: 2019-06-11

## 2019-06-06 RX ORDER — PROPOFOL 10 MG/ML
INJECTION, EMULSION INTRAVENOUS PRN
Status: DISCONTINUED | OUTPATIENT
Start: 2019-06-06 | End: 2019-06-06

## 2019-06-06 RX ORDER — SCOLOPAMINE TRANSDERMAL SYSTEM 1 MG/1
1 PATCH, EXTENDED RELEASE TRANSDERMAL
Status: COMPLETED | OUTPATIENT
Start: 2019-06-06 | End: 2019-06-06

## 2019-06-06 RX ORDER — EPHEDRINE SULFATE 50 MG/ML
INJECTION, SOLUTION INTRAMUSCULAR; INTRAVENOUS; SUBCUTANEOUS PRN
Status: DISCONTINUED | OUTPATIENT
Start: 2019-06-06 | End: 2019-06-06

## 2019-06-06 RX ORDER — CEFAZOLIN SODIUM 2 G/100ML
2 INJECTION, SOLUTION INTRAVENOUS
Status: COMPLETED | OUTPATIENT
Start: 2019-06-06 | End: 2019-06-06

## 2019-06-06 RX ORDER — CEFAZOLIN SODIUM 1 G/3ML
1 INJECTION, POWDER, FOR SOLUTION INTRAMUSCULAR; INTRAVENOUS SEE ADMIN INSTRUCTIONS
Status: DISCONTINUED | OUTPATIENT
Start: 2019-06-06 | End: 2019-06-07 | Stop reason: HOSPADM

## 2019-06-06 RX ORDER — ONDANSETRON 2 MG/ML
4 INJECTION INTRAMUSCULAR; INTRAVENOUS EVERY 30 MIN PRN
Status: DISCONTINUED | OUTPATIENT
Start: 2019-06-06 | End: 2019-06-07 | Stop reason: HOSPADM

## 2019-06-06 RX ORDER — FLUCONAZOLE 150 MG/1
150 TABLET ORAL ONCE
Qty: 1 TABLET | Refills: 1 | Status: SHIPPED | OUTPATIENT
Start: 2019-06-06 | End: 2019-06-11

## 2019-06-06 RX ORDER — NALOXONE HYDROCHLORIDE 0.4 MG/ML
.1-.4 INJECTION, SOLUTION INTRAMUSCULAR; INTRAVENOUS; SUBCUTANEOUS
Status: DISCONTINUED | OUTPATIENT
Start: 2019-06-06 | End: 2019-06-07 | Stop reason: HOSPADM

## 2019-06-06 RX ORDER — GABAPENTIN 300 MG/1
300 CAPSULE ORAL ONCE
Status: COMPLETED | OUTPATIENT
Start: 2019-06-06 | End: 2019-06-06

## 2019-06-06 RX ORDER — ALBUTEROL SULFATE 0.83 MG/ML
2.5 SOLUTION RESPIRATORY (INHALATION) EVERY 4 HOURS PRN
Status: DISCONTINUED | OUTPATIENT
Start: 2019-06-06 | End: 2019-06-07 | Stop reason: HOSPADM

## 2019-06-06 RX ORDER — CEPHALEXIN 500 MG/1
500 CAPSULE ORAL 3 TIMES DAILY
Qty: 21 CAPSULE | Refills: 0 | Status: SHIPPED | OUTPATIENT
Start: 2019-06-06 | End: 2019-06-06

## 2019-06-06 RX ORDER — DEXAMETHASONE SODIUM PHOSPHATE 4 MG/ML
4 INJECTION, SOLUTION INTRA-ARTICULAR; INTRALESIONAL; INTRAMUSCULAR; INTRAVENOUS; SOFT TISSUE EVERY 10 MIN PRN
Status: DISCONTINUED | OUTPATIENT
Start: 2019-06-06 | End: 2019-06-07 | Stop reason: HOSPADM

## 2019-06-06 RX ORDER — OXYCODONE HYDROCHLORIDE 5 MG/1
5-10 TABLET ORAL EVERY 4 HOURS PRN
Status: DISCONTINUED | OUTPATIENT
Start: 2019-06-06 | End: 2019-06-07 | Stop reason: HOSPADM

## 2019-06-06 RX ORDER — DEXAMETHASONE SODIUM PHOSPHATE 4 MG/ML
10 INJECTION, SOLUTION INTRA-ARTICULAR; INTRALESIONAL; INTRAMUSCULAR; INTRAVENOUS; SOFT TISSUE ONCE
Status: COMPLETED | OUTPATIENT
Start: 2019-06-06 | End: 2019-06-06

## 2019-06-06 RX ADMIN — ONDANSETRON 4 MG: 2 INJECTION INTRAMUSCULAR; INTRAVENOUS at 09:18

## 2019-06-06 RX ADMIN — LIDOCAINE HYDROCHLORIDE 3 ML: 20 INJECTION, SOLUTION INFILTRATION; PERINEURAL at 09:08

## 2019-06-06 RX ADMIN — SODIUM CHLORIDE, SODIUM LACTATE, POTASSIUM CHLORIDE, CALCIUM CHLORIDE: 600; 310; 30; 20 INJECTION, SOLUTION INTRAVENOUS at 09:04

## 2019-06-06 RX ADMIN — PROPOFOL 180 MG: 10 INJECTION, EMULSION INTRAVENOUS at 09:08

## 2019-06-06 RX ADMIN — GABAPENTIN 300 MG: 300 CAPSULE ORAL at 08:34

## 2019-06-06 RX ADMIN — EPHEDRINE SULFATE 10 MG: 50 INJECTION, SOLUTION INTRAMUSCULAR; INTRAVENOUS; SUBCUTANEOUS at 09:36

## 2019-06-06 RX ADMIN — FENTANYL CITRATE 50 MCG: 50 INJECTION, SOLUTION INTRAMUSCULAR; INTRAVENOUS at 09:04

## 2019-06-06 RX ADMIN — CEFAZOLIN SODIUM 2 G: 2 INJECTION, SOLUTION INTRAVENOUS at 09:04

## 2019-06-06 RX ADMIN — ACETAMINOPHEN 975 MG: 325 TABLET ORAL at 08:34

## 2019-06-06 RX ADMIN — PROPOFOL 30 MCG/KG/MIN: 10 INJECTION, EMULSION INTRAVENOUS at 09:11

## 2019-06-06 RX ADMIN — SODIUM CHLORIDE, SODIUM LACTATE, POTASSIUM CHLORIDE, CALCIUM CHLORIDE: 600; 310; 30; 20 INJECTION, SOLUTION INTRAVENOUS at 09:58

## 2019-06-06 RX ADMIN — FENTANYL CITRATE 50 MCG: 50 INJECTION, SOLUTION INTRAMUSCULAR; INTRAVENOUS at 10:15

## 2019-06-06 RX ADMIN — SODIUM CHLORIDE, SODIUM LACTATE, POTASSIUM CHLORIDE, CALCIUM CHLORIDE: 600; 310; 30; 20 INJECTION, SOLUTION INTRAVENOUS at 09:03

## 2019-06-06 RX ADMIN — DEXAMETHASONE SODIUM PHOSPHATE 8 MG: 4 INJECTION, SOLUTION INTRA-ARTICULAR; INTRALESIONAL; INTRAMUSCULAR; INTRAVENOUS; SOFT TISSUE at 09:18

## 2019-06-06 RX ADMIN — SCOLOPAMINE TRANSDERMAL SYSTEM 1 PATCH: 1 PATCH, EXTENDED RELEASE TRANSDERMAL at 08:49

## 2019-06-06 NOTE — ANESTHESIA CARE TRANSFER NOTE
BPPatient: Emily Chandler    Procedure(s):  SEPTOPLASTY; BILATERAL INFERIOR TURBINATE SUBMUCOUS RESECTION AND OUT-FRACTURE    Diagnosis: NASAL OBSTRUCTION; SEPTAL DEVIATION; TURBINATE HYPERTROPHY  Diagnosis Additional Information: No value filed.    Anesthesia Type:   No value filed.     Note:  Airway :Blow-by  Patient transferred to:PACU  Comments: /60, HR 78, Sats 99%, HR 80, Temp 36 Patient stated she was very happy and that she had no painHandoff Report: Identifed the Patient, Identified the Reponsible Provider, Reviewed the pertinent medical history, Discussed the surgical course, Reviewed Intra-OP anesthesia mangement and issues during anesthesia, Set expectations for post-procedure period and Allowed opportunity for questions and acknowledgement of understanding      Vitals: (Last set prior to Anesthesia Care Transfer)    CRNA VITALS  6/6/2019 0945 - 6/6/2019 1018      6/6/2019             Pulse:  111    SpO2:  97 %                Electronically Signed By: RANJAN Villa CRNA  June 6, 2019  10:18 AM

## 2019-06-06 NOTE — ANESTHESIA PREPROCEDURE EVALUATION
Anesthesia Pre-Procedure Evaluation    Patient: Emily Chandler   MRN:     [id number] Gender:   female   Age:    39 year old :      1980        Preoperative Diagnosis: NASAL OBSTRUCTION; SEPTAL DEVIATION; TURBINATE HYPERTROPHY   Procedure(s):  SEPTOPLASTY; BILATERAL INFERIOR TURBINATE SUBMUCOUS RESECTION AND OUT-FRACTURE     Past Medical History:   Diagnosis Date     Motion sickness       Past Surgical History:   Procedure Laterality Date     APPENDECTOMY       ENT SURGERY      tonsils          Anesthesia Evaluation     .             ROS/MED HX    ENT/Pulmonary:  - neg pulmonary ROS     Neurologic:  - neg neurologic ROS     Cardiovascular:  - neg cardiovascular ROS       METS/Exercise Tolerance:     Hematologic:  - neg hematologic  ROS       Musculoskeletal:  - neg musculoskeletal ROS       GI/Hepatic:  - neg GI/hepatic ROS       Renal/Genitourinary:  - ROS Renal section negative       Endo:  - neg endo ROS       Psychiatric:  - neg psychiatric ROS       Infectious Disease:  - neg infectious disease ROS       Malignancy:      - no malignancy   Other:    - neg other ROS                     PHYSICAL EXAM:   Mental Status/Neuro: A/A/O   Airway: Facies: Feasible  Mallampati: I  Mouth/Opening: Full  TM distance: > 6 cm  Neck ROM: Full   Respiratory: Auscultation: CTAB     Resp. Rate: Normal     Resp. Effort: Normal      CV: Rhythm: Regular  Rate: Age appropriate  Heart: Normal Sounds   Comments:      Dental: Normal                  Lab Results   Component Value Date    HGB 13.6 2019    HCG Negative 2019       Preop Vitals  BP Readings from Last 3 Encounters:   19 (!) 134/99   19 119/80   19 125/77    Pulse Readings from Last 3 Encounters:   19 90   19 67   19 72      Resp Readings from Last 3 Encounters:   19 19   19 16    SpO2 Readings from Last 3 Encounters:   19 100%   19 99%   19 100%      Temp Readings from Last 1 Encounters:  "  06/06/19 36.4  C (97.6  F) (Temporal)    Ht Readings from Last 1 Encounters:   05/07/19 1.702 m (5' 7\")      Wt Readings from Last 1 Encounters:   05/31/19 55.3 kg (122 lb)    Estimated body mass index is 19.11 kg/m  as calculated from the following:    Height as of 5/7/19: 1.702 m (5' 7\").    Weight as of 5/31/19: 55.3 kg (122 lb).     LDA:            Assessment:   ASA SCORE: 1    NPO Status: > 6 hours since completed Solid Foods   Documentation: H&P complete; Preop Testing complete; Consents complete   Proceeding: Proceed without further delay  Tobacco Use:  NO Active use of Tobacco/UNKNOWN Tobacco use status     Plan:   Anes. Type:  General   Pre-Induction: Midazolam IV; Acetaminophen PO   Induction:  IV (Standard)   Airway: Oral ETT   Access/Monitoring: PIV   Maintenance: Balanced   Emergence: Procedure Site   Logistics: Same Day Surgery     Postop Pain/Sedation Strategy:  Standard-Options: Opioids PRN     PONV Management:  Adult Risk Factors: Female, Non-Smoker, Postop Opioids  Prevention: Ondansetron; Propofol Infusion; Scop. Patch     CONSENT: Direct conversation   Plan and risks discussed with: Patient   Blood Products: Consent Deferred (Minimal Blood Loss)                         Jignesh Mota MD  "

## 2019-06-06 NOTE — DISCHARGE INSTRUCTIONS
Instructions following Septoplasty    Recovery - Everyone recovers differently from a general anesthetic.  Symptoms such as fatigue, nausea, lightheadedness, and sometimes a low grade fever (up to 101 degrees) are not unusual.  As your body removes the anesthetic drugs from circulation, these symptoms will resolve.  Your nose will be sore after surgery, and you may even have symptoms similar to a sinus infection with headache, congestion, and pressure.  These will resolve with healing.  For several days you may experience bloody drainage from the nose, please use the drip pad as necessary for this. Call the office if the bleeding persists after 3 days or is perfuse. There are no diet restrictions after septoplasty, and you can resume your home medications except for blood thinners.  Please do not blow your nose for two weeks after surgery. You may gently dab your nose with Kleenex. Limit your activity to no strenuous activities until I see you for the first follow up visit, and sleep with your head elevated.    Medications - You were sent home with narcotic pain medication.  If you can tolerate the discomfort during your recovery by using just plain Tylenol or ibuprofen (advil), please do so.  However, do not hesitate to use the stronger pain medication if needed.  If you were sent home with an antibiotic, it is primarily used to help the healing process.  If it causes loose bowel movements or other signs of intolerance, it is appropriate to discontinue it.      Complications - Problems related to septoplasty almost always are detected during the operation, and special instruction will be given in that situation.  However, unexpected things can happen.  If you experience persistent bleeding, fevers, changes in vision, severe headache or nasal pain, this may be the sign of an infection.  Any of these symptoms should be called into my office or to the on call ENT if after hours. There may be small plastic pieces  placed inside your nose during surgery (splints). These help to promote the septum into healing in its straightened position, and will be removed at the follow up visit.    Follow up - Splints will be removed at the follow up visit. This is simple and takes just a few seconds afterwards, the improvement you will expereince in breathing from the septoplasty is usually dramatic and immediate.  I will then see you 4 to 6 weeks after that visit to make sure that everything has healed appropriately.    If there are any questions or issues with the above, or if there are other issues that concern you, always feel free to call the clinic and I am happy to speak with you as soon as I can.    Alfonso Iniguez MD  Otolaryngology  [facility]  [phone] or [phone] After hours, [facility] option      SCOPALAMINE Patch placed behind  LEFT ear for nausea relief.  Remove the patch in 24-26 hours.  Dispose in trash and wash hands carefully.     Information for Patients Discharging with a Transderm Scopolamine Patch       Dry mouth is a common side effect.    Drowsiness is another common side effect especially when combined with pain medication.  Please avoid activities that require mental alertness such as driving a car or making important legal decisions.    Since Scopolamine can cause temporary dilation of the pupils and blurred vision if it comes in contact with the eyes; be sure to wash your hands thoroughly with soap and water immediately after handling the patch.   When you remove your patch, please stick it to a tissue or paper towel for disposal.      Remove the patch immediately and contact a physician in the unlikely event that you experience symptoms of acute glaucoma (pain and reddening of the eyes, accompanied by dilated pupils).  Remove the patch if you develop any difficulties urinating.  If you cannot urinate after removing your patch, please notify your surgeon.    [facility]  [facility]  Same-Day Surgery   Adult Discharge Orders & Instructions   For 24 hours after surgery  1. Get plenty of rest.  A responsible adult must stay with you for at least 24 hours after you leave the hospital.   2. Do not drive or use heavy equipment.  If you have weakness or tingling, don't drive or use heavy equipment until this feeling goes away.  3. Do not drink alcohol.  4. Avoid strenuous or risky activities.  Ask for help when climbing stairs.   5. You may feel lightheaded.  IF so, sit for a few minutes before standing.  Have someone help you get up.   6. If you have nausea (feel sick to your stomach): Drink only clear liquids such as apple juice, ginger ale, broth or 7-Up.  Rest may also help.  Be sure to drink enough fluids.  Move to a regular diet as you feel able.  7. You may have a slight fever. Call the doctor if your fever is over 100 F (37.7 C) (taken under the tongue) or lasts longer than 24 hours.  8. You may have a dry mouth, a sore throat, muscle aches or trouble sleeping.  These should go away after 24 hours.  9. Do not make important or legal decisions.   Call your doctor for any of the followin.  Signs of infection (fever, growing tenderness at the surgery site, a large amount of drainage or bleeding, severe pain, foul-smelling drainage, redness, swelling).    2. It has been over 8 to 10 hours since surgery and you are still not able to urinate (pass water).    3.  Headache for over 24 hours.    4.  Numbness, tingling or weakness the day after surgery (if you had spinal anesthesia).  To contact Dr Iniguez call:    [phone] - Day  [phone] - After hours/weekends

## 2019-06-06 NOTE — OP NOTE
PREOPERATIVE DIAGNOSIS  1. Nasal obstruction.   2. Septal deviation.   3. Bilateral turbinate hypertrophy    POSTOPERATIVE DIAGNOSIS  1. Nasal obstruction.   2. Septal deviation.   3. Bilateral turbinate hypertrophy    PROCEDURES PERFORMED:   1. Septoplasty  2. Bilateral submucous resection of inferior turbinates.   3. Bilateral inferior turbinate out-fracture    SURGEON: Alfonso Iniguez MD   ASSISTANTS: none  BLOOD LOSS: 20 mL.   COMPLICATIONS: None.   SPECIMENS: None.   ANESTHESIA: GETA.   DRAINS, IMPLANTS, and DEVICES: bilateral Zimmer splints    INDICATIONS: Emily Chandler presented to me with a history of chronic nasal obstruction. On evaluation, the patient had a deviated septum to the left and bilateral inferior turbinate hypertrophy. Therefore, my recommendation was for the above-named procedures. Preoperatively, risks discussed included the risks of infection, bleeding, the risks of general anesthesia, possible injury to the eyes, base of skull and tear duct system, and possible failure of the surgery to achieve the desired result, septal perforation, and need for revision. The patient understood these risks and possible outcomes and wished to proceed.   OPERATIVE PROCEDURE: After being taken to the operating room and induction of general endotracheal tube anesthesia, the bed was rotated 90 degrees. A procedural pause was performed to identify the patient by name, birthday, and procedure. After that, I began by applying topical anesthetic in the form of 2 cottonoids on each side of the nose which had been soaked with a total of 4 mL of 4% liquid cocaine. In addition, I injected 1% lidocaine with 1:100,000 epinephrine into the right hemitransfixion incision area, bilateral anterior septum, and overlying any spurs. They were prepped and draped in the normal clean fashion.  I removed the cottonoids from the nose and entered the right nasal cavity.   I made a right hemitransfixion septoplasty incision in the right  "nasal vestibule in a traditional open fashion. I then dissected down onto the left side of the cartilaginous septum through the right-sided incision. I then was able to start a submucoperichondrial pocket directly on the left side of the cartilaginous septum. The patient had a deviated maxillary crest as well as the cartilage deflected to the left off the maxillary crest behind the caudal deflection creating a spur impacting the left inferior turbinate. After I completely elevated the mucoperichondrium off the left side of the septum and the bony spur, I continued posterior raising a flap off the bone. I then made a chondrotomy incision through the cartilage in a \"C\" shape fashion approximately 1 cm back from the anterior edge and 1.5 cm from the dorsum. I broke over to the right side and raised a submucoperiosteal flap on the entire right side of the nasal septum. After this was done, I freed the cartilage from the bony septum, and I removed it in one large piece. It was brought to the back table and carved free of the defelection for later reinserted back into the mucoperichondrial pocket. I then used a osteotome to remove the deflected maxillary crest on the left side. I also used a double-action scissors and removed a portion of the posterior bony septum by cutting superiorly leaving an adequate strut. Some of the bony septum was removed and discarded including the left bony spur. I placed the cartilage back in to the flaps. I laid the flaps back together and this significantly improved the nasal airway. I irrigated the septum in between the flaps. I then closed my septoplasty incision with 3 simple interrupted 4-0 chromic gut sutures.   I proceeded to the final components of the operation, which was submucous resection of inferior turbinates with out-fracture.  I injected both inferior turbinates along their entire length with 1% lidocaine, 1:100,000 epinephrine using a spinal needle. I used the 15 blade to " make a stab incision at the head of the right inferior turbinate. I then used a caudal to free the mucoperiosteum from the inferior turbinate bone along the medial aspect of the bone all the way posteriorly. Using a 2.9 mm medtronic shaver blade, I slowly entered the pocket and ran the shaver function to perform my submucous resection of the right inferior turbinate. I used the shaver along the entire length of the inferior turbinate from anterior to posterior.   I then performed the same procedure on the left side. Once again using the 15 blade, I made a stab incision at the head of the left inferior turbinate.  I then used a caudal to free the mucoperiosteum from the inferior turbinate bone along the medial aspect of the bone all the way posteriorly. Using a 2.9 mm medtronic shaver blade, I slowly entered the pocket and ran the shaver function to perform my submucous resection of the left inferior turbinate. I used the shaver along the entire length of the inferior turbinate from anterior to posterior. I closed each stab incision with a simple interrupted 5-0 chromic suture.   Lastly I performed out-fracture by using the back end of the caudal to fracture both inferior turbinate bones laterally. I did this at multiple spots along each bone. I then used the longest nasal speculum to fracture the entire inferior turbinates laterally on both sides. The airway was now wide open on both sides.   Lastly, I placed a Zimmer stent on each side and stitched them together with a 3-0 nylon suture.  At this point, the entire procedure was now complete. I reinspected both sides of the nose and there was good hemostasis with a greatly improved airway bilaterally.  All instruments were accounted for and all counts were correct. The patient's bed was rotated 90 degrees back to the care of anesthesia. They were awakened, extubated and sent to the recovery room in good condition.

## 2019-06-06 NOTE — TELEPHONE ENCOUNTER
RECORDS RECEIVED FROM: Internal    DATE RECEIVED: 8/2/19   NOTES STATUS DETAILS   OFFICE NOTE from referring provider  Internal OV note 5/31/19    OFFICE NOTE from other specialist   N/A    DISCHARGE SUMMARY from hospital  N/A    DISCHARGE REPORT from the ER N/A    OPERATIVE REPORT  N/A    MEDICATION LIST N/A    LABS     PFC TESTING N/A    ANAL PAP N/A    BIOPSIES/PATHOLOGY RELATED TO DIAGNOSIS N/A    DIAGNOSTIC PROCEDURES     COLONOSCOPY N/A    UPPER ENDOSCOPY (EGD) N/A    FLEX SIGMOIDOSCOPY  N/A    ERCP N/A    IMAGING (DISC & REPORT)      CT  N/A    MRI N/A    XRAY N/A    ULTRASOUND (ENDOANAL/ENDORECTAL) N/A

## 2019-06-06 NOTE — ANESTHESIA POSTPROCEDURE EVALUATION
Anesthesia POST Procedure Evaluation    Patient: Emily Chandler   MRN:     [id number] Gender:   female   Age:    39 year old :      1980        Preoperative Diagnosis: NASAL OBSTRUCTION; SEPTAL DEVIATION; TURBINATE HYPERTROPHY   Procedure(s):  SEPTOPLASTY; BILATERAL INFERIOR TURBINATE SUBMUCOUS RESECTION AND OUT-FRACTURE   Postop Comments: No value filed.       Anesthesia Type:  General  No value filed.    Reportable Event: NO     PAIN: Uncomplicated   Sign Out status: Comfortable, Well controlled pain     PONV: No PONV   Sign Out status:  No Nausea or Vomiting     Neuro/Psych: Uneventful perioperative course   Sign Out Status: Preoperative baseline; Age appropriate mentation     Airway/Resp.: Uneventful perioperative course   Sign Out Status: Non labored breathing, age appropriate RR; Resp. Status within EXPECTED Parameters     CV: Uneventful perioperative course   Sign Out status: Appropriate BP and perfusion indices; Appropriate HR/Rhythm     Disposition:   Sign Out in:  PACU  Disposition:  Phase II; Home  Recovery Course: Uneventful  Follow-Up: Not required           Last Anesthesia Record Vitals:  CRNA VITALS  2019 0945 - 2019 1045      2019             Pulse:  111    SpO2:  97 %    EKG:  Sinus rhythm          Last PACU Vitals:  Vitals Value Taken Time   /68 2019 10:20 AM   Temp     Pulse 109 2019 10:20 AM   Resp 28 2019 10:25 AM   SpO2 100 % 2019 10:25 AM   Temp src     NIBP     Pulse     SpO2     Resp     Temp     Ht Rate     Temp 2           Electronically Signed By: Jignesh Mota MD, 2019, 2:56 PM

## 2019-06-10 ENCOUNTER — NURSE TRIAGE (OUTPATIENT)
Dept: NURSING | Facility: CLINIC | Age: 39
End: 2019-06-10

## 2019-06-10 ENCOUNTER — TELEPHONE (OUTPATIENT)
Dept: OTOLARYNGOLOGY | Facility: CLINIC | Age: 39
End: 2019-06-10

## 2019-06-10 NOTE — TELEPHONE ENCOUNTER
Caller reports that she  has had  continual trickling of  Blood in throat from ledt side  And has severe pain  on that side from stent   Caller  wants to have the stent removed as soon as possible  Offered call to  On call provider this evening  But  Caller states she can wait until morning   advised message will be sen tto provider and clinic staff  Elicia Alba RN  FNA            Reason for Disposition    [1] SEVERE post-op pain (e.g., excruciating, pain scale 8-10) AND [2] not controlled with pain medications    Additional Information    Negative: Chest pain    Negative: Difficulty breathing    Negative: Surgical incision symptoms and questions    Negative: [1] Discomfort (pain, burning or stinging) when passing urine AND [2] male    Negative: [1] Discomfort (pain, burning or stinging) when passing urine AND [2] female    Negative: Constipation    Negative: New or worsening leg (calf, thigh) pain    Negative: New or worsening leg swelling    Negative: Dizziness is severe, or persists > 24 hours after surgery    Negative: Pain, redness, swelling, or pus at IV Site    Negative: Symptoms arising from use of a urinary catheter (Hilario or Coude)    Negative: Cast problems or questions    Negative: Medication question    Negative: [1] Widespread rash AND [2] bright red, sunburn-like    Negative: [1] SEVERE headache AND [2] after spinal (epidural) anesthesia    Negative: [1] Vomiting AND [2] persists > 4 hours    Negative: [1] Vomiting AND [2] abdomen looks much more swollen than usual    Negative: [1] Drinking very little AND [2] dehydration suspected (e.g., no urine > 12 hours, very dry mouth, very lightheaded)    Negative: Patient sounds very sick or weak to the triager    Negative: Sounds like a serious complication to the triager    Negative: Fever > 100.5 F (38.1 C)    Protocols used: POST-OP SYMPTOMS AND [id number]

## 2019-06-11 ENCOUNTER — OFFICE VISIT (OUTPATIENT)
Dept: OTOLARYNGOLOGY | Facility: CLINIC | Age: 39
End: 2019-06-11
Payer: COMMERCIAL

## 2019-06-11 VITALS
SYSTOLIC BLOOD PRESSURE: 120 MMHG | OXYGEN SATURATION: 99 % | HEIGHT: 67 IN | BODY MASS INDEX: 19.15 KG/M2 | DIASTOLIC BLOOD PRESSURE: 80 MMHG | WEIGHT: 122 LBS | HEART RATE: 115 BPM

## 2019-06-11 DIAGNOSIS — Z98.890 S/P NASAL SEPTOPLASTY: Primary | ICD-10-CM

## 2019-06-11 PROCEDURE — 99024 POSTOP FOLLOW-UP VISIT: CPT | Performed by: OTOLARYNGOLOGY

## 2019-06-11 ASSESSMENT — MIFFLIN-ST. JEOR: SCORE: 1271.02

## 2019-06-11 NOTE — TELEPHONE ENCOUNTER
Caller reports that she  has had  continual trickling of  Blood in throat from ledt side  And has severe pain  on that side from stent   Caller  wants to have the stent removed as soon as possible  Offered call to  on call provider this evening  But  Caller states she can wait until morning   advised message will be sent to provider and clinic staff  Please call her @ [phone]    Elicia Alba RN  FNA

## 2019-06-11 NOTE — PATIENT INSTRUCTIONS
General Scheduling Information  To schedule your CT/MRI scan, please contact [facility] at [phone]   [address] [place] [address]    To schedule your Surgery, please contact our Specialty Schedulers at [phone]    ENT Clinic Locations Clinic Hours Telephone Number     [facility]  [address] [place] [address]   Tuesday:       8:00am -- 4:00pm    Wednesday:  8:00am - 4:00pm   To schedule an appointment with   Dr. Iniguez,   please contact our   Specialty Scheduling Department at:     [phone]       [facility]  [address] [place] [address]   Friday:          8:00am - 4:00pm         Urgent Care Locations Clinic Hours Telephone Numbers     [facility]  [address] [place] [address]     Monday-Friday:     11:00pm - 9:00pm    Saturday-Sunday:  9:00am - 5:00pm   [phone]     [facility]  [address] [place] [address]     Monday-Friday:      5:00pm - 9:00pm     Saturday-Sunday:  9:00am - 5:00pm   [phone]

## 2019-06-11 NOTE — PROGRESS NOTES
"History of Present Illness - Emily Chandler is a 39 year old female who is status post septoplasty and inferior turbinate reduction on 6/6/19.  She has had persistent left-sided nasal pain and bleeding.  She feels like the splints are significantly aggravating her nose.  She has noted some blood clots drained on the back of her throat intermittently.    Exam -   /80   Pulse 115   Ht 1.702 m (5' 7\")   Wt 55.3 kg (122 lb)   SpO2 99%   BMI 19.11 kg/m    General - The patient is in no distress.  Alert and oriented to person and place, answers questions and cooperates with examination appropriately.   Eyes - Extraocular movements intact.  Sclera were not icteric or injected, conjunctiva were pink and moist.  Nose -she does have some swelling and ecchymosis of her left nasal dorsum and nasal sidewall.  After removing the splints and debris with a suction, her airway looks nice and open on both sides.  The incision is well healed and the turbinates are well lateralized. No sign of synechiae or infection. No septal hematoma or septal perforation.    A/P - Emily Chandler has had a somewhat difficult postop course with her left nasal splint rubbing and part of her nasal cavity causing pain and excess bleeding.  She felt significantly better after removed her splints.  Overall her airway has had a nice result from septoplasty and inferior turbinate reduction.  The position of the septum and nasal tip look good. I cautioned them to avoid any trauma to the nose, hard blowing, or twisting. They can gently irrigate with nasal saline 1-2 times daily, use nasal saline spray, and apply vaseline to the anterior septum as needed.  Return as needed    "

## 2019-06-11 NOTE — LETTER
"    6/11/2019         RE: Emily Chandler  [address]  [address]  [facility] [address]        Dear Colleague,    Thank you for referring your patient, Emily Chandler, to the [facility]. Please see a copy of my visit note below.    History of Present Illness - Emily Chandler is a 39 year old female who is status post septoplasty and inferior turbinate reduction on 6/6/19.  She has had persistent left-sided nasal pain and bleeding.  She feels like the splints are significantly aggravating her nose.  She has noted some blood clots drained on the back of her throat intermittently.    Exam -   /80   Pulse 115   Ht 1.702 m (5' 7\")   Wt 55.3 kg (122 lb)   SpO2 99%   BMI 19.11 kg/m     General - The patient is in no distress.  Alert and oriented to person and place, answers questions and cooperates with examination appropriately.   Eyes - Extraocular movements intact.  Sclera were not icteric or injected, conjunctiva were pink and moist.  Nose -she does have some swelling and ecchymosis of her left nasal dorsum and nasal sidewall.  After removing the splints and debris with a suction, her airway looks nice and open on both sides.  The incision is well healed and the turbinates are well lateralized. No sign of synechiae or infection. No septal hematoma or septal perforation.    A/P - Emily Chandler has had a somewhat difficult postop course with her left nasal splint rubbing and part of her nasal cavity causing pain and excess bleeding.  She felt significantly better after removed her splints.  Overall her airway has had a nice result from septoplasty and inferior turbinate reduction.  The position of the septum and nasal tip look good. I cautioned them to avoid any trauma to the nose, hard blowing, or twisting. They can gently irrigate with nasal saline 1-2 times daily, use nasal saline spray, and apply vaseline to the anterior septum as needed.  Return as needed      Again, thank you for allowing " me to participate in the care of your patient.        Sincerely,        Alfonso Iniguez MD

## 2019-08-02 ENCOUNTER — OFFICE VISIT (OUTPATIENT)
Dept: SURGERY | Facility: CLINIC | Age: 39
End: 2019-08-02
Payer: COMMERCIAL

## 2019-08-02 VITALS
OXYGEN SATURATION: 98 % | BODY MASS INDEX: 19.62 KG/M2 | HEIGHT: 67 IN | DIASTOLIC BLOOD PRESSURE: 92 MMHG | HEART RATE: 90 BPM | SYSTOLIC BLOOD PRESSURE: 133 MMHG | WEIGHT: 125 LBS

## 2019-08-02 DIAGNOSIS — K64.4 ANAL SKIN TAG: Primary | ICD-10-CM

## 2019-08-02 DIAGNOSIS — K64.8 INTERNAL HEMORRHOIDS: ICD-10-CM

## 2019-08-02 ASSESSMENT — MIFFLIN-ST. JEOR: SCORE: 1284.63

## 2019-08-02 ASSESSMENT — PAIN SCALES - GENERAL: PAINLEVEL: NO PAIN (0)

## 2019-08-02 NOTE — LETTER
"     RE: Emily Chandler   [address]  [address]  [facility] [address]     Dear Colleague,    Thank you for referring your patient, Emily Chandler, to the [facility] COLON AND RECTAL SURGERY at [facility]. Please see a copy of my visit note below.    Colon and Rectal Surgery Consult Clinic Note    Date: 2019     Referring provider:  Referred Self, MD  No address on file     RE: Emily Chandler  : 1980  JASBIR: 2019    Emily Chandler is a very pleasant 39 year old female without significant medical history here with hemorrhoids.    HPI: Emily is here today with concerns regarding one hemorrhoid she has had since she was 23-years old. She cannot remember a specific incident that brought on her hemorrhoid. She has seen multiple providers for treatment and has tried many OTC creams with no relief. She states the hemorrhoid has occasional flares, especially when she has diarrhea. She denies constipation and straining. She occasionally notices bright red blood with wiping. She denies sharp pains with bowel movements. She endorses stinging, burning, itching, and irritation of hemorrhoid with flares. She uses a bidet at home and tries to use wet paper towels at work to clean after bowel movements. She does endorse some anal itching but denies fecal incontinence or anal leakage. She denies any prolapsed tissue after bowel movements. She denies any rectal or abdominal surgeries except appendectomy. She denies any personal or family history of colon cancer, IBS, UC, or Crohns. No history of colonoscopies. No further concerns at this time.    Physical Examination:  BP (!) 133/92 (BP Location: Left arm, Patient Position: Sitting, Cuff Size: Adult Regular)   Pulse 90   Ht 5' 7\"   Wt 125 lb   SpO2 98%   BMI 19.58 kg/m     General: Well-nourished female. Alert and orientated. Calm and cooperative.  HEENT: Mucus membranes moist.    Perianal external examination: Exam was " chaperoned by ASHOK Moyer student  Perianal skin: Intact. Moisture and irritation noted.  Lesions: No.  Eversion of buttocks: There was not evidence of an anal fissure. Details: N/A.  Skin tags or external hemorrhoids: Yes: One left posterior skin tag. Wide-based.  Digital rectal examination: Was performed.   Sphincter tone: Good.  Palpable lesions: No.  Other: None.  Bimanual examination: was performed    Anoscopy: Was performed.   Hemorrhoids: Yes. Grade 1-2 left posterior hemorrhoid which connects to the external skin tag. No active bleeding.   Lesions: No    Assessment/Plan: 39 year old female with an external skin tag and internal hemorrhoid. I discussed starting a fiber supplement and using a zinc-based cream for perianal irritation. I discussed procedure for removing the skin tag and explained I cannot guarantee a great cosmetic result and that she can develop skin tags in the future and even from the procedure itself. I also discussed possible banding of her internal hemorrhoid but that this would not get rid of the external skin tag. I encouraged her to try the fiber supplement for 4-6 weeks and return to the clinic if symptoms have not improved and we can proceed with banding. Patient's questions were answered to their stated satisfaction and they are in agreement with this plan.    Medical history:  Past Medical History:   Diagnosis Date     Motion sickness        Surgical history:  Past Surgical History:   Procedure Laterality Date     APPENDECTOMY       ENT SURGERY      tonsils     SEPTOPLASTY, TURBINOPLASTY, COMBINED Bilateral 6/6/2019    Procedure: SEPTOPLASTY; BILATERAL INFERIOR TURBINATE SUBMUCOUS RESECTION AND OUT-FRACTURE;  Surgeon: Alfonso Iniguez MD;  Location:  OR       Problem list:  There are no active problems to display for this patient.      Medications:  No current outpatient medications on file.       Allergies:  Allergies   Allergen Reactions     Nickel        Family  history:  No family history on file.    Social history:  Social History     Tobacco Use     Smoking status: Never Smoker     Smokeless tobacco: Never Used   Substance Use Topics     Alcohol use: Yes     Comment: 2 times every month     Marital status: .      There are no exam notes on file for this visit.     Total face to face time was 20 minutes, >50% counseling.    RANJAN Bejarano, NP-C  Colon and Rectal Surgery   [facility]    This note was created using speech recognition software and may contain unintended word substitutions.

## 2019-08-02 NOTE — PROGRESS NOTES
"Colon and Rectal Surgery Consult Clinic Note    Date: 2019     Referring provider:  Referred Self, MD  No address on file     RE: Emily Chandler  : 1980  JASBIR: 2019    Emily Chandler is a very pleasant 39 year old female without significant medical history here with hemorrhoids.    HPI: Emily is here today with concerns regarding one hemorrhoid she has had since she was 23-years old. She cannot remember a specific incident that brought on her hemorrhoid. She has seen multiple providers for treatment and has tried many OTC creams with no relief. She states the hemorrhoid has occasional flares, especially when she has diarrhea. She denies constipation and straining. She occasionally notices bright red blood with wiping. She denies sharp pains with bowel movements. She endorses stinging, burning, itching, and irritation of hemorrhoid with flares. She uses a bidet at home and tries to use wet paper towels at work to clean after bowel movements. She does endorse some anal itching but denies fecal incontinence or anal leakage. She denies any prolapsed tissue after bowel movements. She denies any rectal or abdominal surgeries except appendectomy. She denies any personal or family history of colon cancer, IBS, UC, or Crohns. No history of colonoscopies. No further concerns at this time.    Physical Examination:  BP (!) 133/92 (BP Location: Left arm, Patient Position: Sitting, Cuff Size: Adult Regular)   Pulse 90   Ht 5' 7\"   Wt 125 lb   SpO2 98%   BMI 19.58 kg/m    General: Well-nourished female. Alert and orientated. Calm and cooperative.  HEENT: Mucus membranes moist.    Perianal external examination: Exam was chaperoned by ASHOK Moyer student  Perianal skin: Intact. Moisture and irritation noted.  Lesions: No.  Eversion of buttocks: There was not evidence of an anal fissure. Details: N/A.  Skin tags or external hemorrhoids: Yes: One left posterior skin tag. Wide-based.  Digital rectal " examination: Was performed.   Sphincter tone: Good.  Palpable lesions: No.  Other: None.  Bimanual examination: was performed    Anoscopy: Was performed.   Hemorrhoids: Yes. Grade 1-2 left posterior hemorrhoid which connects to the external skin tag. No active bleeding.   Lesions: No      Assessment/Plan: 39 year old female with an external skin tag and internal hemorrhoid. I discussed starting a fiber supplement and using a zinc-based cream for perianal irritation. I discussed procedure for removing the skin tag and explained I cannot guarantee a great cosmetic result and that she can develop skin tags in the future and even from the procedure itself. I also discussed possible banding of her internal hemorrhoid but that this would not get rid of the external skin tag. I encouraged her to try the fiber supplement for 4-6 weeks and return to the clinic if symptoms have not improved and we can proceed with banding. Patient's questions were answered to their stated satisfaction and they are in agreement with this plan.    Medical history:  Past Medical History:   Diagnosis Date     Motion sickness        Surgical history:  Past Surgical History:   Procedure Laterality Date     APPENDECTOMY       ENT SURGERY      tonsils     SEPTOPLASTY, TURBINOPLASTY, COMBINED Bilateral 6/6/2019    Procedure: SEPTOPLASTY; BILATERAL INFERIOR TURBINATE SUBMUCOUS RESECTION AND OUT-FRACTURE;  Surgeon: Alfonso Iniguez MD;  Location:  OR       Problem list:  There are no active problems to display for this patient.      Medications:  No current outpatient medications on file.       Allergies:  Allergies   Allergen Reactions     Nickel        Family history:  No family history on file.    Social history:  Social History     Tobacco Use     Smoking status: Never Smoker     Smokeless tobacco: Never Used   Substance Use Topics     Alcohol use: Yes     Comment: 2 times every month     Marital status: .      There are no exam notes  on file for this visit.     Total face to face time was 20 minutes, >50% counseling.    RANJAN Bejarano, NP-C  Colon and Rectal Surgery   [facility]    This note was created using speech recognition software and may contain unintended word substitutions.

## 2019-08-02 NOTE — PATIENT INSTRUCTIONS
Start a daily fiber supplement such as Citrucel or Benefiber. Start with once a day for the first week. Two times a day for the second week. Then three times a day for the next few weeks. Use for 4-6 weeks and if symptoms have not resolved, you may return to the clinic for further treatment options.    Use an over the counter zinc based cream for external irritation. Keep area dry. You can tuck gauze between butt cheeks for comfort.

## 2019-08-11 ENCOUNTER — OFFICE VISIT (OUTPATIENT)
Dept: URGENT CARE | Facility: URGENT CARE | Age: 39
End: 2019-08-11
Payer: COMMERCIAL

## 2019-08-11 VITALS
TEMPERATURE: 97.5 F | BODY MASS INDEX: 19.58 KG/M2 | SYSTOLIC BLOOD PRESSURE: 135 MMHG | WEIGHT: 125 LBS | HEART RATE: 61 BPM | OXYGEN SATURATION: 99 % | DIASTOLIC BLOOD PRESSURE: 85 MMHG

## 2019-08-11 DIAGNOSIS — N30.00 ACUTE CYSTITIS WITHOUT HEMATURIA: Primary | ICD-10-CM

## 2019-08-11 DIAGNOSIS — R30.0 DYSURIA: ICD-10-CM

## 2019-08-11 LAB
ALBUMIN UR-MCNC: 30 MG/DL
APPEARANCE UR: ABNORMAL
BACTERIA #/AREA URNS HPF: ABNORMAL /HPF
BILIRUB UR QL STRIP: NEGATIVE
COLOR UR AUTO: YELLOW
GLUCOSE UR STRIP-MCNC: NEGATIVE MG/DL
HGB UR QL STRIP: ABNORMAL
KETONES UR STRIP-MCNC: NEGATIVE MG/DL
LEUKOCYTE ESTERASE UR QL STRIP: ABNORMAL
NITRATE UR QL: NEGATIVE
NON-SQ EPI CELLS #/AREA URNS LPF: ABNORMAL /LPF
PH UR STRIP: 7 PH (ref 5–7)
RBC #/AREA URNS AUTO: ABNORMAL /HPF
SOURCE: ABNORMAL
SP GR UR STRIP: 1.02 (ref 1–1.03)
UROBILINOGEN UR STRIP-ACNC: 0.2 EU/DL (ref 0.2–1)
WBC #/AREA URNS AUTO: ABNORMAL /HPF

## 2019-08-11 PROCEDURE — 87088 URINE BACTERIA CULTURE: CPT | Performed by: PHYSICIAN ASSISTANT

## 2019-08-11 PROCEDURE — 81001 URINALYSIS AUTO W/SCOPE: CPT | Performed by: FAMILY MEDICINE

## 2019-08-11 PROCEDURE — 87086 URINE CULTURE/COLONY COUNT: CPT | Performed by: PHYSICIAN ASSISTANT

## 2019-08-11 PROCEDURE — 87186 SC STD MICRODIL/AGAR DIL: CPT | Performed by: PHYSICIAN ASSISTANT

## 2019-08-11 PROCEDURE — 99213 OFFICE O/P EST LOW 20 MIN: CPT | Performed by: PHYSICIAN ASSISTANT

## 2019-08-11 RX ORDER — FLUCONAZOLE 150 MG/1
150 TABLET ORAL ONCE
Qty: 1 TABLET | Refills: 0 | Status: SHIPPED | OUTPATIENT
Start: 2019-08-11 | End: 2019-11-14

## 2019-08-11 RX ORDER — NITROFURANTOIN 25; 75 MG/1; MG/1
100 CAPSULE ORAL 2 TIMES DAILY
Qty: 14 CAPSULE | Refills: 0 | Status: SHIPPED | OUTPATIENT
Start: 2019-08-11 | End: 2019-11-14

## 2019-08-11 ASSESSMENT — ENCOUNTER SYMPTOMS
VOMITING: 0
ABDOMINAL PAIN: 0
NAUSEA: 0
DYSURIA: 1
FLANK PAIN: 0
SHORTNESS OF BREATH: 0
FREQUENCY: 1
FEVER: 0
MYALGIAS: 0
CHILLS: 0
HEMATURIA: 0
DIARRHEA: 0

## 2019-08-11 NOTE — PROGRESS NOTES
HPI  August 11, 2019    HPI: Emily Chandler is a 39 year old female who complains of dysuria & urinary frequency onset 2 days ago. No treatments tried. No known alleviating or aggravating factors. Symptoms are moderate in severity & constant in duration. Denies hematuria, vaginal discharge, genital sores, abd pain, flank pain, N/V/D, and any other symptoms.    Past Medical History:   Diagnosis Date     Motion sickness      Past Surgical History:   Procedure Laterality Date     APPENDECTOMY       ENT SURGERY      tonsils     SEPTOPLASTY, TURBINOPLASTY, COMBINED Bilateral 6/6/2019    Procedure: SEPTOPLASTY; BILATERAL INFERIOR TURBINATE SUBMUCOUS RESECTION AND OUT-FRACTURE;  Surgeon: Alfonso Iniguez MD;  Location:  OR     Social History     Tobacco Use     Smoking status: Never Smoker     Smokeless tobacco: Never Used   Substance Use Topics     Alcohol use: Yes     Comment: 2 times every month      Drug use: Yes     Types: Marijuana       Problem list, Medication list, Allergies, and Medical/Social/Surgical histories reviewed in [facility] and updated as appropriate.      Review of Systems   Constitutional: Negative for chills and fever.   Respiratory: Negative for shortness of breath.    Cardiovascular: Negative for chest pain.   Gastrointestinal: Negative for abdominal pain, diarrhea, nausea and vomiting.   Genitourinary: Positive for dysuria and frequency. Negative for flank pain and hematuria.   Musculoskeletal: Negative for myalgias.   All other systems reviewed and are negative.      Physical Exam   Constitutional: She is oriented to person, place, and time.   Cardiovascular: Normal rate and regular rhythm.   Pulmonary/Chest: Effort normal and breath sounds normal.   Abdominal: Soft. Normal appearance. There is no tenderness. There is no CVA tenderness.   Neurological: She is oriented to person, place, and time.   Skin: Skin is warm, dry and intact.   Nursing note and vitals reviewed.    Vital Signs  /85    Pulse 61   Temp 97.5  F (36.4  C) (Oral)   Wt 56.7 kg (125 lb)   SpO2 99%   Breastfeeding? No   BMI 19.58 kg/m       Diagnostic Test Results:  Results for orders placed or performed in visit on 08/11/19   *UA reflex to Microscopic and Culture ([facility] (except [place] and [place])   Result Value Ref Range    Color Urine Yellow     Appearance Urine Slightly Cloudy     Glucose Urine Negative NEG^Negative mg/dL    Bilirubin Urine Negative NEG^Negative    Ketones Urine Negative NEG^Negative mg/dL    Specific Gravity Urine 1.025 1.003 - 1.035    Blood Urine Large (A) NEG^Negative    pH Urine 7.0 5.0 - 7.0 pH    Protein Albumin Urine 30 (A) NEG^Negative mg/dL    Urobilinogen Urine 0.2 0.2 - 1.0 EU/dL    Nitrite Urine Negative NEG^Negative    Leukocyte Esterase Urine Moderate (A) NEG^Negative    Source Midstream Urine    Urine Microscopic   Result Value Ref Range    WBC Urine 25-50 (A) OTO5^0 - 5 /HPF    RBC Urine 25-50 (A) OTO2^O - 2 /HPF    Squamous Epithelial /LPF Urine Few FEW^Few /LPF    Bacteria Urine Few (A) NEG^Negative /HPF       ASSESSMENT/PLAN      ICD-10-CM    1. Acute cystitis without hematuria N30.00 nitroFURantoin macrocrystal-monohydrate (MACROBID) 100 MG capsule     fluconazole (DIFLUCAN) 150 MG tablet   2. Dysuria R30.0 *UA reflex to Microscopic and Culture ([facility] (except [place] and [place])     Urine Microscopic     Urine Culture Aerobic Bacterial      UA c/w UTI, no s/sx pyelonephritis. Rx Macrobid. Pt also requests Diflucan as she often gets yeast infections when on antibiotics.       I have discussed any lab or imaging results, the patient's diagnosis, and my plan of treatment with the patient and/or family. Patient is aware to come back in if with worsening symptoms or if no relief despite treatment plan.  Patient voiced understanding and had no further questions.       Follow Up: Return in about 1 week (around 8/18/2019) for Follow up w/ PCP if  not better.    EVARISTO Lucero, PA-C  [facility]

## 2019-08-14 LAB
BACTERIA SPEC CULT: ABNORMAL
SPECIMEN SOURCE: ABNORMAL

## 2019-10-24 ENCOUNTER — OFFICE VISIT (OUTPATIENT)
Dept: FAMILY MEDICINE | Facility: CLINIC | Age: 39
End: 2019-10-24
Payer: COMMERCIAL

## 2019-10-24 VITALS
RESPIRATION RATE: 16 BRPM | SYSTOLIC BLOOD PRESSURE: 126 MMHG | DIASTOLIC BLOOD PRESSURE: 80 MMHG | OXYGEN SATURATION: 98 % | WEIGHT: 125 LBS | BODY MASS INDEX: 19.62 KG/M2 | HEIGHT: 67 IN | HEART RATE: 71 BPM | TEMPERATURE: 97.7 F

## 2019-10-24 DIAGNOSIS — Z11.3 SCREEN FOR STD (SEXUALLY TRANSMITTED DISEASE): ICD-10-CM

## 2019-10-24 DIAGNOSIS — N89.8 VAGINAL DISCHARGE: Primary | ICD-10-CM

## 2019-10-24 LAB
SPECIMEN SOURCE: NORMAL
WET PREP SPEC: NORMAL

## 2019-10-24 PROCEDURE — 87210 SMEAR WET MOUNT SALINE/INK: CPT | Performed by: NURSE PRACTITIONER

## 2019-10-24 PROCEDURE — 99213 OFFICE O/P EST LOW 20 MIN: CPT | Performed by: NURSE PRACTITIONER

## 2019-10-24 PROCEDURE — 36415 COLL VENOUS BLD VENIPUNCTURE: CPT | Performed by: NURSE PRACTITIONER

## 2019-10-24 PROCEDURE — 86780 TREPONEMA PALLIDUM: CPT | Performed by: NURSE PRACTITIONER

## 2019-10-24 PROCEDURE — 87389 HIV-1 AG W/HIV-1&-2 AB AG IA: CPT | Performed by: NURSE PRACTITIONER

## 2019-10-24 PROCEDURE — 87591 N.GONORRHOEAE DNA AMP PROB: CPT | Performed by: NURSE PRACTITIONER

## 2019-10-24 PROCEDURE — 87491 CHLMYD TRACH DNA AMP PROBE: CPT | Performed by: NURSE PRACTITIONER

## 2019-10-24 PROCEDURE — 86803 HEPATITIS C AB TEST: CPT | Performed by: NURSE PRACTITIONER

## 2019-10-24 ASSESSMENT — MIFFLIN-ST. JEOR: SCORE: 1284.63

## 2019-10-24 NOTE — PROGRESS NOTES
Chief Complaint   Patient presents with     STD     add trichomoniasis     Vaginal Problem     greenish discharge x 1 month     Subjective     Emily Chandler is a 39 year old female who presents to clinic today for the following health issues:    Emily reports that she has had a vaginal discharge for approximately a month.  She has a history of intermittent vaginitis in her past.  Mild itching.  No odor.  No UTI symptoms.  The discharge has a yellowish green tint.  She is requesting a full STD panel today as well.      There is no problem list on file for this patient.    Past Surgical History:   Procedure Laterality Date     APPENDECTOMY       ENT SURGERY      tonsils     SEPTOPLASTY, TURBINOPLASTY, COMBINED Bilateral 6/6/2019    Procedure: SEPTOPLASTY; BILATERAL INFERIOR TURBINATE SUBMUCOUS RESECTION AND OUT-FRACTURE;  Surgeon: Alfonso Iniguez MD;  Location:  OR       Social History     Tobacco Use     Smoking status: Never Smoker     Smokeless tobacco: Never Used   Substance Use Topics     Alcohol use: Yes     Comment: 2 times every month      History reviewed. No pertinent family history.      Current Outpatient Medications   Medication Sig Dispense Refill     levonorgestrel (MIRENA) 20 MCG/24HR IUD 1 each by Intrauterine route once       Allergies   Allergen Reactions     Nickel      No lab results found.   BP Readings from Last 3 Encounters:   10/24/19 126/80   08/11/19 135/85   08/02/19 (!) 133/92    Wt Readings from Last 3 Encounters:   10/24/19 56.7 kg (125 lb)   08/11/19 56.7 kg (125 lb)   08/02/19 56.7 kg (125 lb)              Reviewed and updated as needed this visit by Provider  Jean-Paul Londono         Review of Systems   ROS COMP: Constitutional, HEENT, cardiovascular, pulmonary, GI, , musculoskeletal, neuro, skin, endocrine and psych systems are negative, except as otherwise noted.      Objective    /80 (BP Location: Left arm, Patient Position: Sitting, Cuff Size: Adult Regular)   Pulse 71    "Temp 97.7  F (36.5  C) (Oral)   Resp 16   Ht 1.702 m (5' 7\")   Wt 56.7 kg (125 lb)   SpO2 98%   BMI 19.58 kg/m    Body mass index is 19.58 kg/m .  Physical Exam   GENERAL: healthy, alert, well nourished, well hydrated, no distress  NECK: no tenderness, no adenopathy, supple  RESP: lungs clear to auscultation - no rales, no rhonchi, no wheezes  CV: regular rates and rhythm, normal S1 S2, and no murmur, no click or rub   ABDOMEN: soft, no tenderness, no  hepatosplenomegaly, no masses, normal bowel sounds. No rebound or guarding.  No CVA tenderness  SKIN: warm and dry  .       Diagnostic Test Results:  Labs reviewed in Epic  Results for orders placed or performed in visit on 10/24/19   Wet prep   Result Value Ref Range    Specimen Description Vagina     Wet Prep Many  WBC'S seen       Wet Prep No clue cells seen     Wet Prep No yeast seen     Wet Prep No Trichomonas seen              Assessment & Plan     (N89.8) Vaginal discharge  (primary encounter diagnosis)  Comment:  Plan: Wet prep        Wet prep is clear/negative.  The patient was reassured/she is happy.  Follow up prn.     (Z11.3) Screen for STD (sexually transmitted disease)  Comment:   Plan: Chlamydia trachomatis PCR, Neisseria         gonorrhoeae PCR, Hepatitis C antibody, HIV         Antigen Antibody Combo, Treponema Abs w Reflex         to RPR and Titer        Std panel pending.  Safe sex practices recommended.      Return in about 3 months (around 1/24/2020) for Physical Exam.    RANJAN Bear [facility]        "

## 2019-10-24 NOTE — Clinical Note
Last pap with HPV 4/2017, normal results. Next pap with HPV in 3 years. Done at [facility].

## 2019-10-25 LAB
C TRACH DNA SPEC QL NAA+PROBE: NEGATIVE
HCV AB SERPL QL IA: NONREACTIVE
HIV 1+2 AB+HIV1 P24 AG SERPL QL IA: NONREACTIVE
N GONORRHOEA DNA SPEC QL NAA+PROBE: NEGATIVE
SPECIMEN SOURCE: NORMAL
SPECIMEN SOURCE: NORMAL
T PALLIDUM AB SER QL: NONREACTIVE

## 2019-11-14 ENCOUNTER — OFFICE VISIT (OUTPATIENT)
Dept: URGENT CARE | Facility: URGENT CARE | Age: 39
End: 2019-11-14
Payer: COMMERCIAL

## 2019-11-14 VITALS
OXYGEN SATURATION: 99 % | SYSTOLIC BLOOD PRESSURE: 118 MMHG | DIASTOLIC BLOOD PRESSURE: 76 MMHG | TEMPERATURE: 98.2 F | WEIGHT: 121 LBS | HEART RATE: 80 BPM | BODY MASS INDEX: 19.44 KG/M2 | HEIGHT: 66 IN

## 2019-11-14 DIAGNOSIS — J20.9 ACUTE BRONCHITIS WITH SYMPTOMS > 10 DAYS: Primary | ICD-10-CM

## 2019-11-14 PROCEDURE — 99213 OFFICE O/P EST LOW 20 MIN: CPT | Performed by: FAMILY MEDICINE

## 2019-11-14 RX ORDER — BENZONATATE 100 MG/1
100 CAPSULE ORAL 3 TIMES DAILY PRN
COMMUNITY

## 2019-11-14 RX ORDER — IPRATROPIUM BROMIDE 42 UG/1
2 SPRAY, METERED NASAL 4 TIMES DAILY
Qty: 15 ML | Refills: 0 | Status: SHIPPED | OUTPATIENT
Start: 2019-11-14

## 2019-11-14 ASSESSMENT — MIFFLIN-ST. JEOR: SCORE: 1250.6

## 2019-11-14 NOTE — PROGRESS NOTES
"Subjective:   Emily Chandler is a 39 year old female who presents for   Chief Complaint   Patient presents with     Urgent Care     Pt in clinic to have eval for productive cough lasting 2 weeks.     Cough     Started on Halloween when she was trick or treating with her niece then started to develop scratchy throat then her symptoms over days progressed to her lung. She had a temp of 101-102 for a couple nights then stayed at 100 for a few more nights. Temps have been normal since. The cough is consistently bothersome. Coughing up phlegm especially in the morning. She denies facial pressure. She had loose stools a couple times but has stopped.     She did do [facility]uwell after the first week and they recommended tessalon and fluticasone. She picked up mucinex yesterday. Cough is worse at night time. She does feel like she has post nasal drip.     There are no active problems to display for this patient.      Current Outpatient Medications   Medication     benzonatate (TESSALON) 100 MG capsule     Dextromethorphan-guaiFENesin (DM MAX MAXIMUM STRENGTH PO)     fluticasone (VERAMYST) 27.5 MCG/SPRAY nasal spray     ipratropium (ATROVENT) 0.06 % nasal spray     levonorgestrel (MIRENA) 20 MCG/24HR IUD     Pseudoeph-Doxylamine-DM-APAP (NYQUIL PO)     Pseudoephedrine-APAP-DM (DAYQUIL OR)     No current facility-administered medications for this visit.      ROS:  As above per HPI    Objective:   /76   Pulse 80   Temp 98.2  F (36.8  C) (Oral)   Ht 1.676 m (5' 6\")   Wt 54.9 kg (121 lb)   SpO2 99%   BMI 19.53 kg/m  , Body mass index is 19.53 kg/m .  Gen:  NAD, well-nourished, sitting in chair comfortably  HEENT: EOMI, sclera anicteric, Head normocephalic, ; nares patent; moist mucous membranes, absent tonsils  Neck: trachea midline, no thyromegaly  CV:  Hemodynamically stable, RRR  Pulm:  no increased work of breathing , CTAB, no wheezes/rales/rhonchi   ABD: soft, non-distended  Extrem: no cyanosis, edema or " clubbing  Skin: no obvious rashes or abnormalities  Psych: Euthymic, linear thoughts, normal rate of speech    No results found for any visits on 11/14/19.    Assessment & Plan:   Emily Chandler, 39 year old female who presents with:    Acute bronchitis with symptoms > 10 days  Appears well hydrated and normal mentation. Cough is bothersome mostly at night especially with runny nose. Recommending atrovent at this time, stop fluticasone as she has no symptoms of sinus pressure/pain. Continue DM Max and tessalon for cough. Suspicion for pneumonia low with her normal vitals and unremarkable lung exam  - ipratropium (ATROVENT) 0.06 % nasal spray  Dispense: 15 mL; Refill: 0    Phillip Wheat MD   [facility]    The use of Dragon/[employer] dictation services may have been used to construct the content in this note; any grammatical or spelling errors are non-intentional. Please contact the author of this note directly if you are in need of any clarification.

## 2019-11-14 NOTE — PATIENT INSTRUCTIONS
STOP the flonase    For runny nose try the ipratropium nasal spray    Continue the DM MAX ; okay to take with tessalon to help with cough      If you develop fever, pain with breathing, shortness of breath,  Dizziness return to be seen     Expect improvement over the next week

## 2020-03-11 ENCOUNTER — HEALTH MAINTENANCE LETTER (OUTPATIENT)
Age: 40
End: 2020-03-11

## 2021-01-03 ENCOUNTER — HEALTH MAINTENANCE LETTER (OUTPATIENT)
Age: 41
End: 2021-01-03

## 2021-04-25 ENCOUNTER — HEALTH MAINTENANCE LETTER (OUTPATIENT)
Age: 41
End: 2021-04-25

## 2021-10-10 ENCOUNTER — HEALTH MAINTENANCE LETTER (OUTPATIENT)
Age: 41
End: 2021-10-10

## 2022-05-21 ENCOUNTER — HEALTH MAINTENANCE LETTER (OUTPATIENT)
Age: 42
End: 2022-05-21

## 2022-09-18 ENCOUNTER — HEALTH MAINTENANCE LETTER (OUTPATIENT)
Age: 42
End: 2022-09-18

## 2023-06-04 ENCOUNTER — HEALTH MAINTENANCE LETTER (OUTPATIENT)
Age: 43
End: 2023-06-04

## 2024-02-25 ENCOUNTER — HEALTH MAINTENANCE LETTER (OUTPATIENT)
Age: 44
End: 2024-02-25

## (undated) DEVICE — SYR 10ML FINGER CONTROL W/O NDL 309695

## (undated) DEVICE — DECANTER TRANSFER DEVICE 2008S

## (undated) DEVICE — SOL WATER IRRIG 1000ML BOTTLE 07139-09

## (undated) DEVICE — DRAPE SPLIT EENT 76X124" 3X28" 9447

## (undated) DEVICE — BLADE INFERIOR TURBINATE 2.9MMX11CM 1882940HR

## (undated) DEVICE — PREP POVIDONE IODINE SWABS X3

## (undated) DEVICE — SU ETHILON 3-0 FS-1 18" 669H

## (undated) DEVICE — Device

## (undated) DEVICE — SU CHROMIC 4-0 P-3 18" 1654G

## (undated) DEVICE — SPONGE COTTONOID 1/2X3" 80-1407

## (undated) DEVICE — GLOVE PROTEXIS W/NEU-THERA 7.5  2D73TE75

## (undated) DEVICE — SYR EAR BULB 3OZ 0035830

## (undated) DEVICE — PACK MINOR SBA15MIFSE

## (undated) RX ORDER — CEFAZOLIN SODIUM 2 G/100ML
INJECTION, SOLUTION INTRAVENOUS
Status: DISPENSED
Start: 2019-06-06

## (undated) RX ORDER — LIDOCAINE HYDROCHLORIDE AND EPINEPHRINE 10; 10 MG/ML; UG/ML
INJECTION, SOLUTION INFILTRATION; PERINEURAL
Status: DISPENSED
Start: 2019-06-06

## (undated) RX ORDER — ACETAMINOPHEN 325 MG/1
TABLET ORAL
Status: DISPENSED
Start: 2019-06-06

## (undated) RX ORDER — GINSENG 100 MG
CAPSULE ORAL
Status: DISPENSED
Start: 2019-06-06

## (undated) RX ORDER — EPHEDRINE SULFATE 50 MG/ML
INJECTION, SOLUTION INTRAVENOUS
Status: DISPENSED
Start: 2019-06-06

## (undated) RX ORDER — PROPOFOL 10 MG/ML
INJECTION, EMULSION INTRAVENOUS
Status: DISPENSED
Start: 2019-06-06

## (undated) RX ORDER — LIDOCAINE HYDROCHLORIDE 20 MG/ML
INJECTION, SOLUTION INFILTRATION; PERINEURAL
Status: DISPENSED
Start: 2019-06-06

## (undated) RX ORDER — FENTANYL CITRATE 50 UG/ML
INJECTION, SOLUTION INTRAMUSCULAR; INTRAVENOUS
Status: DISPENSED
Start: 2019-06-06

## (undated) RX ORDER — SCOLOPAMINE TRANSDERMAL SYSTEM 1 MG/1
PATCH, EXTENDED RELEASE TRANSDERMAL
Status: DISPENSED
Start: 2019-06-06

## (undated) RX ORDER — GABAPENTIN 300 MG/1
CAPSULE ORAL
Status: DISPENSED
Start: 2019-06-06